# Patient Record
Sex: FEMALE | Race: WHITE | Employment: UNEMPLOYED | URBAN - METROPOLITAN AREA
[De-identification: names, ages, dates, MRNs, and addresses within clinical notes are randomized per-mention and may not be internally consistent; named-entity substitution may affect disease eponyms.]

---

## 2018-07-03 ENCOUNTER — HOSPITAL ENCOUNTER (EMERGENCY)
Age: 23
Discharge: HOME | End: 2018-07-03
Attending: EMERGENCY MEDICINE
Payer: OTHER GOVERNMENT

## 2018-07-03 ENCOUNTER — APPOINTMENT (OUTPATIENT)
Dept: GENERAL RADIOLOGY | Age: 23
End: 2018-07-03
Attending: EMERGENCY MEDICINE
Payer: OTHER GOVERNMENT

## 2018-07-03 VITALS
RESPIRATION RATE: 16 BRPM | WEIGHT: 170 LBS | DIASTOLIC BLOOD PRESSURE: 75 MMHG | TEMPERATURE: 97.6 F | HEART RATE: 84 BPM | SYSTOLIC BLOOD PRESSURE: 122 MMHG | HEIGHT: 63 IN | BODY MASS INDEX: 30.12 KG/M2 | OXYGEN SATURATION: 100 %
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
## 2018-07-03 PROCEDURE — 74011250637 HC RX REV CODE- 250/637: Performed by: EMERGENCY MEDICINE

## 2018-07-03 PROCEDURE — 77030028224 HC PDNG CST BSNM -A

## 2018-07-03 PROCEDURE — 99283 EMERGENCY DEPT VISIT LOW MDM: CPT

## 2018-07-03 PROCEDURE — 71046 X-RAY EXAM CHEST 2 VIEWS: CPT

## 2018-07-03 PROCEDURE — 73110 X-RAY EXAM OF WRIST: CPT

## 2018-07-03 PROCEDURE — 77030008323 HC SPLNT FNGR GTR DJOR -A

## 2018-07-03 RX ORDER — HYDROCODONE BITARTRATE AND ACETAMINOPHEN 5; 325 MG/1; MG/1
1 TABLET ORAL
Qty: 6 TAB | Refills: 0 | Status: SHIPPED | OUTPATIENT
Start: 2018-07-03 | End: 2020-03-26

## 2018-07-03 RX ORDER — NAPROXEN 500 MG/1
500 TABLET ORAL 2 TIMES DAILY WITH MEALS
Qty: 20 TAB | Refills: 0 | Status: SHIPPED | OUTPATIENT
Start: 2018-07-03 | End: 2018-07-13

## 2018-07-03 RX ORDER — HYDROCODONE BITARTRATE AND ACETAMINOPHEN 5; 325 MG/1; MG/1
1 TABLET ORAL
Status: COMPLETED | OUTPATIENT
Start: 2018-07-03 | End: 2018-07-03

## 2018-07-03 RX ORDER — CYCLOBENZAPRINE HCL 5 MG
5 TABLET ORAL
Qty: 12 TAB | Refills: 0 | Status: SHIPPED | OUTPATIENT
Start: 2018-07-03 | End: 2020-03-26

## 2018-07-03 RX ADMIN — HYDROCODONE BITARTRATE AND ACETAMINOPHEN 1 TABLET: 5; 325 TABLET ORAL at 11:05
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
## 2018-07-03 NOTE — LETTER
[facility] EMERGENCY DEPT 
[address] [place] [address] [id number] 
[phone] Work/School Note Date: 7/3/2018 To Whom It May concern: 
 
Nicki Santana was seen and treated today in the emergency room by the following provider(s): 
Attending Provider: Chetna Weems.  Pat Edmonds MD.   
 
Nicki Santana may not Keyboard or Type until cleared by orthopaedist. 
Sincerely, 
 
 
 
 
Jeff Cedeno RN

## 2018-07-03 NOTE — ED NOTES
Pt was discharged and given instructions by Dr Arti Child . Pt verbalized good understanding of all discharge instructions,prescriptions and F/U care. All questions answered. Pt in stable condition on discharge.

## 2020-02-25 ENCOUNTER — HOSPITAL ENCOUNTER (OUTPATIENT)
Dept: LAB | Age: 25
Discharge: HOME | End: 2020-02-25

## 2020-02-25 ENCOUNTER — OFFICE VISIT (OUTPATIENT)
Dept: INTERNAL MEDICINE CLINIC | Age: 25
End: 2020-02-25

## 2020-02-25 VITALS
HEART RATE: 81 BPM | BODY MASS INDEX: 32.96 KG/M2 | DIASTOLIC BLOOD PRESSURE: 64 MMHG | HEIGHT: 63 IN | WEIGHT: 186 LBS | OXYGEN SATURATION: 98 % | RESPIRATION RATE: 20 BRPM | TEMPERATURE: 97.9 F | SYSTOLIC BLOOD PRESSURE: 92 MMHG

## 2020-02-25 DIAGNOSIS — R79.89 ELEVATED LFTS: ICD-10-CM

## 2020-02-25 DIAGNOSIS — E03.9 ACQUIRED HYPOTHYROIDISM: ICD-10-CM

## 2020-02-25 DIAGNOSIS — K21.9 GASTROESOPHAGEAL REFLUX DISEASE WITHOUT ESOPHAGITIS: ICD-10-CM

## 2020-02-25 DIAGNOSIS — E66.9 OBESITY (BMI 30-39.9): ICD-10-CM

## 2020-02-25 DIAGNOSIS — Z23 ENCOUNTER FOR IMMUNIZATION: ICD-10-CM

## 2020-02-25 DIAGNOSIS — R55 SYNCOPE, UNSPECIFIED SYNCOPE TYPE: Primary | ICD-10-CM

## 2020-02-25 DIAGNOSIS — G89.29 OTHER CHRONIC PAIN: ICD-10-CM

## 2020-02-25 PROBLEM — B02.9 SHINGLES: Status: ACTIVE | Noted: 2020-02-25

## 2020-02-25 PROBLEM — E28.2 PCOS (POLYCYSTIC OVARIAN SYNDROME): Status: ACTIVE | Noted: 2020-02-25

## 2020-02-25 PROBLEM — R74.8 ELEVATED LIVER ENZYMES: Status: ACTIVE | Noted: 2020-02-25

## 2020-02-25 PROBLEM — R73.03 PREDIABETES: Status: ACTIVE | Noted: 2020-02-25

## 2020-02-25 PROBLEM — A49.02 MRSA INFECTION: Status: ACTIVE | Noted: 2020-02-25

## 2020-02-25 PROBLEM — E78.00 HYPERCHOLESTEROLEMIA: Status: ACTIVE | Noted: 2020-02-25

## 2020-02-25 PROBLEM — G44.89 OTHER HEADACHE SYNDROME: Status: ACTIVE | Noted: 2020-02-25

## 2020-02-25 PROBLEM — N80.9 ENDOMETRIOSIS: Status: ACTIVE | Noted: 2020-02-25

## 2020-02-25 PROBLEM — K12.2 LUDWIG'S ANGINA: Status: ACTIVE | Noted: 2020-02-25

## 2020-02-25 LAB
ALBUMIN SERPL-MCNC: 4.2 G/DL (ref 3.5–5)
ALBUMIN/GLOB SERPL: 1.2 {RATIO} (ref 1.1–2.2)
ALP SERPL-CCNC: 101 U/L (ref 45–117)
ALT SERPL-CCNC: 147 U/L (ref 12–78)
ANION GAP SERPL CALC-SCNC: 4 MMOL/L (ref 5–15)
AST SERPL-CCNC: 102 U/L (ref 15–37)
BASOPHILS # BLD: 0.1 K/UL (ref 0–0.1)
BASOPHILS NFR BLD: 1 % (ref 0–1)
BILIRUB SERPL-MCNC: 0.3 MG/DL (ref 0.2–1)
BUN SERPL-MCNC: 14 MG/DL (ref 6–20)
BUN/CREAT SERPL: 19 (ref 12–20)
CALCIUM SERPL-MCNC: 9.9 MG/DL (ref 8.5–10.1)
CHLORIDE SERPL-SCNC: 102 MMOL/L (ref 97–108)
CHOLEST SERPL-MCNC: 173 MG/DL
CO2 SERPL-SCNC: 32 MMOL/L (ref 21–32)
CREAT SERPL-MCNC: 0.72 MG/DL (ref 0.55–1.02)
DIFFERENTIAL METHOD BLD: NORMAL
EOSINOPHIL # BLD: 0.1 K/UL (ref 0–0.4)
EOSINOPHIL NFR BLD: 1 % (ref 0–7)
ERYTHROCYTE [DISTWIDTH] IN BLOOD BY AUTOMATED COUNT: 12.4 % (ref 11.5–14.5)
GLOBULIN SER CALC-MCNC: 3.4 G/DL (ref 2–4)
GLUCOSE SERPL-MCNC: 104 MG/DL (ref 65–100)
HCT VFR BLD AUTO: 39.7 % (ref 35–47)
HDLC SERPL-MCNC: 70 MG/DL
HDLC SERPL: 2.5 {RATIO} (ref 0–5)
HGB BLD-MCNC: 12.6 G/DL (ref 11.5–16)
IMM GRANULOCYTES # BLD AUTO: 0 K/UL (ref 0–0.04)
IMM GRANULOCYTES NFR BLD AUTO: 0 % (ref 0–0.5)
LDLC SERPL CALC-MCNC: 80.8 MG/DL (ref 0–100)
LIPID PROFILE,FLP: NORMAL
LYMPHOCYTES # BLD: 2.6 K/UL (ref 0.8–3.5)
LYMPHOCYTES NFR BLD: 39 % (ref 12–49)
MCH RBC QN AUTO: 29.1 PG (ref 26–34)
MCHC RBC AUTO-ENTMCNC: 31.7 G/DL (ref 30–36.5)
MCV RBC AUTO: 91.7 FL (ref 80–99)
MONOCYTES # BLD: 0.6 K/UL (ref 0–1)
MONOCYTES NFR BLD: 9 % (ref 5–13)
NEUTS SEG # BLD: 3.3 K/UL (ref 1.8–8)
NEUTS SEG NFR BLD: 50 % (ref 32–75)
NRBC # BLD: 0 K/UL (ref 0–0.01)
NRBC BLD-RTO: 0 PER 100 WBC
PLATELET # BLD AUTO: 229 K/UL (ref 150–400)
PMV BLD AUTO: 11.9 FL (ref 8.9–12.9)
POTASSIUM SERPL-SCNC: 4.3 MMOL/L (ref 3.5–5.1)
PROT SERPL-MCNC: 7.6 G/DL (ref 6.4–8.2)
RBC # BLD AUTO: 4.33 M/UL (ref 3.8–5.2)
SODIUM SERPL-SCNC: 138 MMOL/L (ref 136–145)
TRIGL SERPL-MCNC: 111 MG/DL (ref ?–150)
TSH SERPL DL<=0.05 MIU/L-ACNC: 2.66 UIU/ML (ref 0.36–3.74)
VLDLC SERPL CALC-MCNC: 22.2 MG/DL
WBC # BLD AUTO: 6.6 K/UL (ref 3.6–11)

## 2020-02-25 RX ORDER — METFORMIN HYDROCHLORIDE 500 MG/1
TABLET ORAL 2 TIMES DAILY WITH MEALS
COMMUNITY
End: 2020-03-26 | Stop reason: SDUPTHER

## 2020-02-25 RX ORDER — LEVOTHYROXINE SODIUM 100 UG/1
TABLET ORAL
COMMUNITY
End: 2020-03-26 | Stop reason: SDUPTHER

## 2020-02-25 RX ORDER — VALACYCLOVIR HYDROCHLORIDE 500 MG/1
TABLET, FILM COATED ORAL AS NEEDED
COMMUNITY
End: 2020-02-25

## 2020-02-25 RX ORDER — RANITIDINE 150 MG/1
150 TABLET, FILM COATED ORAL 2 TIMES DAILY
COMMUNITY
End: 2020-03-26 | Stop reason: SDUPTHER

## 2020-02-25 RX ORDER — ROSUVASTATIN CALCIUM 10 MG/1
10 TABLET, COATED ORAL
COMMUNITY
End: 2020-03-26
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
## 2020-02-25 NOTE — PROGRESS NOTES
Amy Seals is a 25 y.o. female who presents for routine immunizations. She denies any symptoms , reactions or allergies that would exclude them from being immunized today. Risks and adverse reactions were discussed and the VIS was given to them. All questions were addressed. There were no reactions observed.     Fatou Díaz LPN

## 2020-02-25 NOTE — PATIENT INSTRUCTIONS
Vaccine Information Statement Tdap (Tetanus, Diphtheria, Pertussis) Vaccine: What You Need to Know Many Vaccine Information Statements are available in [place] and other languages. See www.immunize.org/vis. Hojas de Información Sobre Vacunas están disponibles en español y en muchos otros idiomas. Visite [email or web address].si 1. Why get vaccinated? Tetanus, diphtheria, and pertussis are very serious diseases. Tdap vaccine can protect us from these diseases. And, Tdap vaccine given to pregnant women can protect  babies against pertussis. TETANUS (Lockjaw) is rare in the [facility] today. It causes painful muscle tightening and stiffness, usually all over the body. ? It can lead to tightening of muscles in the head and neck so you cant open your mouth, swallow, or sometimes even breathe. Tetanus kills about 1 out of 10 people who are infected even after receiving the best medical care. DIPHTHERIA is also rare in the [facility] today. It can cause a thick coating to form in the back of the throat. ? It can lead to breathing problems, heart failure, paralysis, and death. PERTUSSIS (Whooping Cough) causes severe coughing spells, which can cause difficulty breathing, vomiting, and disturbed sleep. ? It can also lead to weight loss, incontinence, and rib fractures. Up to 2 in 100 adolescents and 5 in 100 adults with pertussis are hospitalized or have complications, which could include pneumonia or death. These diseases are caused by bacteria. Diphtheria and pertussis are spread from person to person through secretions from coughing or sneezing. Tetanus enters the body through cuts, scratches, or wounds. Before vaccines, as many as 200,000 cases of diphtheria, 200,000 cases of pertussis, and hundreds of cases of tetanus, were reported in the [place] each year.  Since vaccination began, reports of cases for tetanus and diphtheria have dropped by about 99% and for pertussis by about 80%. 2. Tdap vaccine Tdap vaccine can protect adolescents and adults from tetanus, diphtheria, and pertussis. One dose of Tdap is routinely given at age 6 or 15. People who did not get Tdap at that age should get it as soon as possible. Tdap is especially important for health care professionals and anyone having close contact with a baby younger than 12 months. Pregnant women should get a dose of Tdap during every pregnancy, to protect the  from pertussis. Infants are most at risk for severe, life-threatening complications from pertussis. Another vaccine, called Td, protects against tetanus and diphtheria, but not pertussis. A Td booster should be given every 10 years. Tdap may be given as one of these boosters if you have never gotten Tdap before. Tdap may also be given after a severe cut or burn to prevent tetanus infection. Your doctor or the person giving you the vaccine can give you more information. Tdap may safely be given at the same time as other vaccines. 3. Some people should not get this vaccine  A person who has ever had a life-threatening allergic reaction after a previous dose of any diphtheria, tetanus or pertussis containing vaccine, OR has a severe allergy to any part of this vaccine, should not get Tdap vaccine. Tell the person giving the vaccine about any severe allergies.  Anyone who had coma or long repeated seizures within 7 days after a childhood dose of DTP or DTaP, or a previous dose of Tdap, should not get Tdap, unless a cause other than the vaccine was found. They can still get Td.  Talk to your doctor if you: 
- have seizures or another nervous system problem, 
- had severe pain or swelling after any vaccine containing diphtheria, tetanus or pertussis,  
- ever had a condition called Guillain Barré Syndrome (GBS), 
- arent feeling well on the day the shot is scheduled. 4. Risks With any medicine, including vaccines, there is a chance of side effects. These are usually mild and go away on their own. Serious reactions are also possible but are rare. Most people who get Tdap vaccine do not have any problems with it. Mild Problems following Tdap 
(Did not interfere with activities)  Pain where the shot was given (about 3 in 4 adolescents or 2 in 3 adults)  Redness or swelling where the shot was given (about 1 person in 5)  Mild fever of at least 100.4°F (up to about 1 in 25 adolescents or 1 in 100 adults)  Headache (about 3 or 4 people in 10)  Tiredness (about 1 person in 3 or 4)  Nausea, vomiting, diarrhea, stomach ache (up to 1 in 4 adolescents or 1 in 10 adults)  Chills,  sore joints (about 1 person in 10)  Body aches (about 1 person in 3 or 4)  Rash, swollen glands (uncommon) Moderate Problems following Tdap (Interfered with activities, but did not require medical attention)  Pain where the shot was given (up to 1 in 5 or 6)  Redness or swelling where the shot was given (up to about 1 in 16 adolescents or 1 in 12 adults)  Fever over 102°F (about 1 in 100 adolescents or 1 in 250 adults)  Headache (about 1 in 7 adolescents or 1 in 10 adults)  Nausea, vomiting, diarrhea, stomach ache (up to 1 or 3 people in 100)  Swelling of the entire arm where the shot was given (up to about 1 in 500). Severe Problems following Tdap 
(Unable to perform usual activities; required medical attention)  Swelling, severe pain, bleeding, and redness in the arm where the shot was given (rare). Problems that could happen after any vaccine:  People sometimes faint after a medical procedure, including vaccination. Sitting or lying down for about 15 minutes can help prevent fainting, and injuries caused by a fall. Tell your doctor if you feel dizzy, or have vision changes or ringing in the ears.  Some people get severe pain in the shoulder and have difficulty moving the arm where a shot was given. This happens very rarely.  Any medication can cause a severe allergic reaction. Such reactions from a vaccine are very rare, estimated at fewer than 1 in a million doses, and would happen within a few minutes to a few hours after the vaccination. As with any medicine, there is a very remote chance of a vaccine causing a serious injury or death. The safety of vaccines is always being monitored. For more information, visit: www.cdc.gov/vaccinesafety/ 
 
 
The [facility] Vaccine Injury Compensation Program (VICP) is a federal program that was created to compensate people who may have been injured by certain vaccines. Persons who believe they may have been injured by a vaccine can learn about the program and about filing a claim by calling 8-898.357.4936 or visiting the [employer] website at [email or web address].  There is a time limit to file a claim for compensation. 7. How can I learn more?  Ask your doctor. He or she can give you the vaccine package insert or suggest other sources of information.  Call your local or state health department.  Contact the Centers for Disease Control and Prevention (CDC): 
- Call 9-509.135.4958 (1-800-CDC-INFO) or 
- Visit CDCs website at www.cdc.gov/vaccines Vaccine Information Statement Tdap Vaccine 
(2/24/2015) [address] [employer] 512BN-63 Department of [facility] and [employer] Centers for Disease Control and Prevention Office Use Only

## 2020-02-26 LAB
EST. AVERAGE GLUCOSE BLD GHB EST-MCNC: 108 MG/DL
HBA1C MFR BLD: 5.4 % (ref 4–5.6)

## 2020-03-05 NOTE — PROGRESS NOTES
Please call the pt and let her know that her labs are normal except for elevated liver enzymes. I recommend stopping rosuvastatin to see if that is what is affecting her liver enzymes. Please confirm whether or not she has a follow-up appointment with the GI doctors. I am still waiting on records from her other doctors.

## 2020-03-05 NOTE — PROGRESS NOTES
Called patient ASHISH bravo. Informed her labs are normal except for elevated liver enzymes.  Doctor is recommening stopping rosuvastatin to see if that is what is affecting her liver enzymes. Patient agreed to the plan to stop medication. Patient does not have a follow up appointment with the GI doctors.

## 2020-03-26 ENCOUNTER — VIRTUAL VISIT (OUTPATIENT)
Dept: INTERNAL MEDICINE CLINIC | Age: 25
End: 2020-03-26

## 2020-03-26 DIAGNOSIS — E55.9 VITAMIN D DEFICIENCY: ICD-10-CM

## 2020-03-26 DIAGNOSIS — R55 SYNCOPE, UNSPECIFIED SYNCOPE TYPE: ICD-10-CM

## 2020-03-26 DIAGNOSIS — R73.03 PREDIABETES: ICD-10-CM

## 2020-03-26 DIAGNOSIS — K21.9 GASTROESOPHAGEAL REFLUX DISEASE WITHOUT ESOPHAGITIS: ICD-10-CM

## 2020-03-26 DIAGNOSIS — G89.29 OTHER CHRONIC PAIN: ICD-10-CM

## 2020-03-26 DIAGNOSIS — E03.9 ACQUIRED HYPOTHYROIDISM: ICD-10-CM

## 2020-03-26 DIAGNOSIS — R74.8 ELEVATED LIVER ENZYMES: Primary | ICD-10-CM

## 2020-03-26 DIAGNOSIS — E28.2 PCOS (POLYCYSTIC OVARIAN SYNDROME): ICD-10-CM

## 2020-03-26 RX ORDER — LEVOTHYROXINE SODIUM 100 UG/1
100 TABLET ORAL
Qty: 90 TAB | Refills: 3 | Status: SHIPPED | OUTPATIENT
Start: 2020-03-26 | End: 2021-02-04 | Stop reason: SDUPTHER

## 2020-03-26 RX ORDER — MELATONIN
1000 DAILY
Qty: 90 TAB | Refills: 3 | Status: SHIPPED | OUTPATIENT
Start: 2020-03-26 | End: 2021-02-04

## 2020-03-26 RX ORDER — RANITIDINE 150 MG/1
150 TABLET, FILM COATED ORAL 2 TIMES DAILY
Qty: 180 TAB | Refills: 3 | Status: SHIPPED | OUTPATIENT
Start: 2020-03-26 | End: 2020-04-22

## 2020-03-26 RX ORDER — METFORMIN HYDROCHLORIDE 500 MG/1
500 TABLET ORAL 2 TIMES DAILY WITH MEALS
Qty: 180 TAB | Refills: 3 | Status: SHIPPED | OUTPATIENT
Start: 2020-03-26 | End: 2021-02-04 | Stop reason: SDUPTHER

## 2020-03-26 NOTE — PROGRESS NOTES
Jennifer Mcdaniel is a 22 y.o. female who was seen by synchronous (real-time) audio-video technology on 3/26/2020. This visit was done with doxy. me    Assessment and Plan   Diagnoses and all orders for this visit:    1. Elevated liver enzymes  2. Syncope, unspecified syncope type  3. Other chronic pain  -     HBV CORE AB, IGG/IGM; Future  -     HEP B SURFACE AG; Future  -     HEP B SURFACE AB; Future  -     HEPATITIS C AB; Future  -     CELIAC ANTIBODY PROFILE; Future  -     CK; Future  -     CORTISOL, AM; Future  -     HEPATIC FUNCTION PANEL; Future  Patient has been off of statin for a month. We will see if that has made a difference to her liver enzymes. autoimmune workup negative, raudel negative, alpha1 negative. Imaging negative according to note from GI. Evaluate for other possible causes of elevated liver enzymes, syncope, and chronic pain. Has follow-up with her neurologist next week. We will try to get those records to see what work-up neurology has done for her syncope. Recommend stopping all alcohol use. Consider neuropathy work-up if neurology has not ordered those labs. Consider echo. Had an MRI and EMG done. Need those records. Advised patient to come in the morning for lab work so that her cortisol level is accurate    4. Vitamin D deficiency  -     cholecalciferol (Vitamin D3) (1000 Units /25 mcg) tablet; Take 1 Tab by mouth daily. Refill provided    5. Acquired hypothyroidism  -     levothyroxine (SYNTHROID) 100 mcg tablet; Take 1 Tab by mouth Daily (before breakfast). Refill provided    6. PCOS (polycystic ovarian syndrome)  -     metFORMIN (GLUCOPHAGE) 500 mg tablet; Take 1 Tab by mouth two (2) times daily (with meals). Refill provided    7. Prediabetes  -     metFORMIN (GLUCOPHAGE) 500 mg tablet; Take 1 Tab by mouth two (2) times daily (with meals). Refill provided    8. Gastroesophageal reflux disease without esophagitis  -     raNITIdine (ZANTAC) 150 mg tablet;  Take 1 Tab by mouth two (2) times a day. Refill provided      We discussed the expected course, resolution and complications of the diagnosis(es) in detail. Medication risks, benefits, costs, interactions, and alternatives were discussed as indicated. I advised her to contact the office if her condition worsens, changes or fails to improve as anticipated. She expressed understanding with the diagnosis(es) and plan. Return to clinic: Pending labs  Please call the pt and let her know that her liver function test are significantly improved. Will repeat labs in 6 months to ensure stability. Elevated levels most likely due to statin since they got better after stopping the medication. The rest of her labs were normal and did not show any other causes for elevated liver enzymes. Letter sent  Pt not HBV immune  4/8/20 addendum - review of neuro records - LE EMG was normal    Curtis Luque MD  [facility]  3/26/2020    Future Appointments   Date Time Provider Rosalind Baldwini   3/30/4433 90:27 AM Cheo Gotti MD [address]        Subjective   Chief Complaint   Elevated LFTs    Gus Benítez is a 22 y.o. female     Elevated LFTs has been off of her statin for the past month. Will need to get rechecked. Discussed other possible etiologies. Not currently on any over-the-counter medications or herbal supplements. Syncope patient reports 1 or 2 episodes since her last visit. Both episodes still during episodes of severe pain. Has a follow-up with neurology next week. Chronic pain continues to be persistent. Review of Systems   Constitutional: Negative for chills and fever. Neurological: Positive for loss of consciousness. Objective   Vitals: There were no vitals taken for this visit. Physical Exam  Constitutional:       Appearance: Normal appearance.    Pulmonary:      Effort: Pulmonary effort is normal. Neurological:      Mental Status: She is alert. Psychiatric:         Mood and Affect: Mood normal.         Thought Content: Thought content normal.         Judgment: Judgment normal.          Due to this being a TeleHealth evaluation, many elements of the physical examination are unable to be assessed. Voice recognition software is utilized and this note may contain transcription errors       Pursuant to the emergency declaration under the [address] [place] [address] waiver authority and the [employer] Act, this Virtual  Visit was conducted, with patient's consent, to reduce the patient's risk of exposure to COVID-19 and provide continuity of care for an established patient. Services were provided through a video synchronous discussion virtually to substitute for in-person clinic visit.

## 2020-03-27 ENCOUNTER — HOSPITAL ENCOUNTER (OUTPATIENT)
Dept: LAB | Age: 25
Discharge: HOME | End: 2020-03-27

## 2020-03-27 DIAGNOSIS — R74.8 ELEVATED LIVER ENZYMES: ICD-10-CM

## 2020-03-27 LAB
ALBUMIN SERPL-MCNC: 4 G/DL (ref 3.5–5)
ALBUMIN/GLOB SERPL: 1.2 {RATIO} (ref 1.1–2.2)
ALP SERPL-CCNC: 103 U/L (ref 45–117)
ALT SERPL-CCNC: 66 U/L (ref 12–78)
AST SERPL-CCNC: 26 U/L (ref 15–37)
BILIRUB DIRECT SERPL-MCNC: <0.1 MG/DL (ref 0–0.2)
BILIRUB SERPL-MCNC: 0.2 MG/DL (ref 0.2–1)
CK SERPL-CCNC: 60 U/L (ref 26–192)
CORTIS AM PEAK SERPL-MCNC: 10.4 UG/DL (ref 4.3–22.45)
GLOBULIN SER CALC-MCNC: 3.3 G/DL (ref 2–4)
HBV SURFACE AB SER QL: NONREACTIVE
HBV SURFACE AB SER-ACNC: <3.1 MIU/ML
HBV SURFACE AG SER QL: 0.54 INDEX
HBV SURFACE AG SER QL: NEGATIVE
HCV AB SERPL QL IA: NONREACTIVE
HCV COMMENT,HCGAC: NORMAL
PROT SERPL-MCNC: 7.3 G/DL (ref 6.4–8.2)

## 2020-03-28 LAB
HBV CORE AB SERPL QL IA: NEGATIVE
HBV CORE IGM SERPL QL IA: NEGATIVE

## 2020-03-30 ENCOUNTER — TELEPHONE (OUTPATIENT)
Dept: INTERNAL MEDICINE CLINIC | Age: 25
End: 2020-03-30

## 2020-03-30 LAB
GLIADIN PEPTIDE IGA SER-ACNC: 3 UNITS (ref 0–19)
GLIADIN PEPTIDE IGG SER-ACNC: 2 UNITS (ref 0–19)
IGA SERPL-MCNC: 89 MG/DL (ref 87–352)
TTG IGA SER-ACNC: <2 U/ML (ref 0–3)
TTG IGG SER-ACNC: 3 U/ML (ref 0–5)

## 2020-03-30 NOTE — PROGRESS NOTES
Please call the pt and let her know that her liver function test are significantly improved. Will repeat labs in 6 months to ensure stability. Elevated levels most likely due to statin since they got better after stopping the medication. The rest of her labs were normal and did not show any other causes for elevated liver enzymes.  Letter sent

## 2020-03-30 NOTE — TELEPHONE ENCOUNTER
Spoke to patient mother Trina Stein on HIPPA form informed her Ashley's  liver function test are significantly improved.  Dr Jaquan Muñoz will repeat labs in 6 months to ensure stability.  Elevated levels most likely due to statin since they got better after stopping the medication.  The rest of her labs were normal and did not show any other causes for elevated liver enzymes. Advised a letter with details has been sent.

## 2020-04-22 ENCOUNTER — TELEPHONE (OUTPATIENT)
Dept: INTERNAL MEDICINE CLINIC | Age: 25
End: 2020-04-22

## 2020-04-22 DIAGNOSIS — K21.9 GASTROESOPHAGEAL REFLUX DISEASE WITHOUT ESOPHAGITIS: Primary | ICD-10-CM

## 2020-04-22 RX ORDER — FAMOTIDINE 20 MG/1
20 TABLET, FILM COATED ORAL 2 TIMES DAILY
Qty: 180 TAB | Refills: 2 | Status: SHIPPED | OUTPATIENT
Start: 2020-04-22 | End: 2021-02-04

## 2020-07-13 ENCOUNTER — HOSPITAL ENCOUNTER (OUTPATIENT)
Dept: LAB | Age: 25
Discharge: HOME | End: 2020-07-13

## 2020-07-13 ENCOUNTER — OFFICE VISIT (OUTPATIENT)
Dept: INTERNAL MEDICINE CLINIC | Age: 25
End: 2020-07-13

## 2020-07-13 VITALS
OXYGEN SATURATION: 98 % | DIASTOLIC BLOOD PRESSURE: 71 MMHG | HEART RATE: 83 BPM | TEMPERATURE: 98.9 F | WEIGHT: 184.38 LBS | BODY MASS INDEX: 32.67 KG/M2 | HEIGHT: 63 IN | SYSTOLIC BLOOD PRESSURE: 106 MMHG | RESPIRATION RATE: 18 BRPM

## 2020-07-13 DIAGNOSIS — R10.9 ABDOMINAL CRAMPING: Primary | ICD-10-CM

## 2020-07-13 DIAGNOSIS — K21.9 GASTROESOPHAGEAL REFLUX DISEASE WITHOUT ESOPHAGITIS: ICD-10-CM

## 2020-07-13 DIAGNOSIS — L85.3 DRY SKIN: ICD-10-CM

## 2020-07-13 DIAGNOSIS — H69.83 DYSFUNCTION OF BOTH EUSTACHIAN TUBES: ICD-10-CM

## 2020-07-13 DIAGNOSIS — R10.9 ABDOMINAL CRAMPING: ICD-10-CM

## 2020-07-13 LAB
ALBUMIN SERPL-MCNC: 4.2 G/DL (ref 3.5–5)
ALBUMIN/GLOB SERPL: 1.3 {RATIO} (ref 1.1–2.2)
ALP SERPL-CCNC: 90 U/L (ref 45–117)
ALT SERPL-CCNC: 46 U/L (ref 12–78)
ANION GAP SERPL CALC-SCNC: 9 MMOL/L (ref 5–15)
AST SERPL-CCNC: 32 U/L (ref 15–37)
BASOPHILS # BLD: 0 K/UL (ref 0–0.1)
BASOPHILS NFR BLD: 1 % (ref 0–1)
BILIRUB SERPL-MCNC: 0.3 MG/DL (ref 0.2–1)
BUN SERPL-MCNC: 12 MG/DL (ref 6–20)
BUN/CREAT SERPL: 17 (ref 12–20)
CALCIUM SERPL-MCNC: 9.6 MG/DL (ref 8.5–10.1)
CHLORIDE SERPL-SCNC: 103 MMOL/L (ref 97–108)
CO2 SERPL-SCNC: 28 MMOL/L (ref 21–32)
CREAT SERPL-MCNC: 0.71 MG/DL (ref 0.55–1.02)
DIFFERENTIAL METHOD BLD: NORMAL
EOSINOPHIL # BLD: 0.1 K/UL (ref 0–0.4)
EOSINOPHIL NFR BLD: 1 % (ref 0–7)
ERYTHROCYTE [DISTWIDTH] IN BLOOD BY AUTOMATED COUNT: 12.6 % (ref 11.5–14.5)
GLOBULIN SER CALC-MCNC: 3.2 G/DL (ref 2–4)
GLUCOSE SERPL-MCNC: 92 MG/DL (ref 65–100)
HCT VFR BLD AUTO: 37.9 % (ref 35–47)
HGB BLD-MCNC: 12.4 G/DL (ref 11.5–16)
IMM GRANULOCYTES # BLD AUTO: 0 K/UL (ref 0–0.04)
IMM GRANULOCYTES NFR BLD AUTO: 0 % (ref 0–0.5)
LYMPHOCYTES # BLD: 2.7 K/UL (ref 0.8–3.5)
LYMPHOCYTES NFR BLD: 42 % (ref 12–49)
MCH RBC QN AUTO: 28.2 PG (ref 26–34)
MCHC RBC AUTO-ENTMCNC: 32.7 G/DL (ref 30–36.5)
MCV RBC AUTO: 86.3 FL (ref 80–99)
MONOCYTES # BLD: 0.5 K/UL (ref 0–1)
MONOCYTES NFR BLD: 7 % (ref 5–13)
NEUTS SEG # BLD: 3.2 K/UL (ref 1.8–8)
NEUTS SEG NFR BLD: 49 % (ref 32–75)
NRBC # BLD: 0 K/UL (ref 0–0.01)
NRBC BLD-RTO: 0 PER 100 WBC
PLATELET # BLD AUTO: 222 K/UL (ref 150–400)
PMV BLD AUTO: 12.4 FL (ref 8.9–12.9)
POTASSIUM SERPL-SCNC: 4.2 MMOL/L (ref 3.5–5.1)
PROT SERPL-MCNC: 7.4 G/DL (ref 6.4–8.2)
RBC # BLD AUTO: 4.39 M/UL (ref 3.8–5.2)
SODIUM SERPL-SCNC: 140 MMOL/L (ref 136–145)
WBC # BLD AUTO: 6.5 K/UL (ref 3.6–11)

## 2020-07-13 RX ORDER — PANTOPRAZOLE SODIUM 40 MG/1
40 TABLET, DELAYED RELEASE ORAL DAILY
Qty: 30 TAB | Refills: 1 | Status: SHIPPED | OUTPATIENT
Start: 2020-07-13 | End: 2021-02-04

## 2020-07-13 RX ORDER — DICYCLOMINE HYDROCHLORIDE 10 MG/1
20 CAPSULE ORAL
Qty: 30 CAP | Refills: 1 | Status: SHIPPED | OUTPATIENT
Start: 2020-07-13 | End: 2021-02-04

## 2020-07-13 NOTE — PROGRESS NOTES
Assessment and Plan   Diagnoses and all orders for this visit:    1. Abdominal cramping  -     FECAL FAT, QL; Future  -     dicyclomine (BENTYL) 10 mg capsule; Take 2 Caps by mouth three (3) times daily as needed for Abdominal Cramps. -     METABOLIC PANEL, COMPREHENSIVE; Future  -     CBC WITH AUTOMATED DIFF; Future  Unclear etiology. Has had multiple colonoscopies in the past which were reportedly negative. Did have her gallbladder taken out late last year. Patient is wondering if her reflux is anything to do with it. Recommend switching or adding pantoprazole. Advised on proper administration. Bentyl for symptomatic treatment. If no improvement, recommend going back to GI    2. Gastroesophageal reflux disease without esophagitis  -     pantoprazole (PROTONIX) 40 mg tablet; Take 1 Tab by mouth daily. 3. Dysfunction of both eustachian tubes  Recommend over-the-counter nasal steroid spray. Advised on proper administration    4. Dry skin  Patient will try some of the lotion she has at home. Otherwise, advised to call me if it does not get any better and we will treat as a possible fungal infection      Benefits, risks, possible drug interactions, and side effects of all new medications were reviewed with the patient. Pt verbalized understanding. Return to clinic: As needed if no improvement in symptoms    Ernie Verdugo MD  [facility]  7/13/2020    No future appointments. Subjective   Chief Complaint   abd cramping    Cade Silveira is a 22 y.o. female     Presents to clinic for evaluation of abdominal cramping associated with diarrhea for the past month. A previous note of mine mentioned alternating constipation and diarrhea but today patient reports that diarrhea is a new issue for her. Has a history of multiple colonoscopies which have been negative.   Cramping is her main symptom at the moment although she reports that she has diarrhea quickly after she eats. Stools are foul-smelling but denies any blood. Previous celiac testing was negative. Denies any fever, chills. Reports occasional nausea but no vomiting. Feels that her reflux is not adequately controlled on famotidine. Wondering if she should switch to something else. Has also noticed some dry skin on her feet. Also reports bilateral ear pain ongoing for the past month as well. Denies any hearing loss, sinus congestion or drainage although reports occasional sinus headaches. Review of Systems   Constitutional: Negative for chills and fever. Respiratory: Negative for shortness of breath. Cardiovascular: Negative for chest pain. Objective   Vitals:       Visit Vitals  /71 (BP 1 Location: Left arm, BP Patient Position: Sitting)   Pulse 83   Temp 98.9 °F (37.2 °C) (Oral)   Resp 18   Ht 5' 3\" (1.6 m)   Wt 184 lb 6 oz (83.6 kg)   SpO2 98%   BMI 32.66 kg/m²        Physical Exam  Constitutional:       Appearance: Normal appearance. She is not ill-appearing. HENT:      Right Ear: Ear canal and external ear normal.      Left Ear: Ear canal and external ear normal.      Ears:      Comments: Retracted eardrums bilaterally especially on the left  Eyes:      General:         Right eye: No discharge. Left eye: No discharge. Extraocular Movements: Extraocular movements intact. Conjunctiva/sclera: Conjunctivae normal.   Cardiovascular:      Rate and Rhythm: Normal rate and regular rhythm. Heart sounds: No murmur. No friction rub. No gallop. Pulmonary:      Effort: No respiratory distress. Breath sounds: No wheezing, rhonchi or rales. Abdominal:      General: Bowel sounds are normal. There is no distension. Palpations: Abdomen is soft. There is no mass. Tenderness: There is no abdominal tenderness. There is no guarding or rebound. Neurological:      Mental Status: She is alert.       Gait: Gait normal.   Psychiatric:         Thought Content: Thought content normal.         Judgment: Judgment normal.          Current Outpatient Medications   Medication Sig    pantoprazole (PROTONIX) 40 mg tablet Take 1 Tab by mouth daily.  dicyclomine (BENTYL) 10 mg capsule Take 2 Caps by mouth three (3) times daily as needed for Abdominal Cramps.  famotidine (PEPCID) 20 mg tablet Take 1 Tab by mouth two (2) times a day.  metFORMIN (GLUCOPHAGE) 500 mg tablet Take 1 Tab by mouth two (2) times daily (with meals).  levothyroxine (SYNTHROID) 100 mcg tablet Take 1 Tab by mouth Daily (before breakfast).  cholecalciferol (Vitamin D3) (1000 Units /25 mcg) tablet Take 1 Tab by mouth daily.  pregabalin (LYRICA PO) Take 50 mg by mouth three (3) times daily.  Cetirizine 10 mg cap Take  by mouth. No current facility-administered medications for this visit.         Voice recognition software is utilized and this note may contain transcription errors

## 2020-07-15 LAB
FAT STL QL: NORMAL
NEUTRAL FAT STL QL: NORMAL

## 2020-07-17 ENCOUNTER — TELEPHONE (OUTPATIENT)
Dept: INTERNAL MEDICINE CLINIC | Age: 25
End: 2020-07-17

## 2020-07-21 NOTE — PROGRESS NOTES
Discussed lab results with the patient. Had tried calling her last week but she was out on vacation. Reports that her diarrhea has since improved somewhat from her visit. We discussed that the most likely diagnosis for her symptoms is irritable bowel syndrome given her history of alternating constipation and diarrhea and negative colonoscopies and previous work-up. Patient verbalized understanding and will let us know if she needs medications for symptoms.

## 2020-09-28 ENCOUNTER — VIRTUAL VISIT (OUTPATIENT)
Dept: INTERNAL MEDICINE CLINIC | Age: 25
End: 2020-09-28
Payer: MEDICAID

## 2020-09-28 DIAGNOSIS — R07.81 RIB PAIN: Primary | ICD-10-CM

## 2020-09-28 DIAGNOSIS — R69 ILL-DEFINED CONDITION: ICD-10-CM

## 2020-09-28 PROCEDURE — 99214 OFFICE O/P EST MOD 30 MIN: CPT | Performed by: INTERNAL MEDICINE

## 2020-09-28 RX ORDER — MELOXICAM 7.5 MG/1
TABLET ORAL
Qty: 30 TAB | Refills: 0 | Status: SHIPPED | OUTPATIENT
Start: 2020-09-28 | End: 2021-02-04

## 2020-09-28 NOTE — PROGRESS NOTES
Consent: Angelina Sampson, who was seen by synchronous (real-time) audio-video technology, and/or her healthcare decision maker, is aware that this patient-initiated, Telehealth encounter on 9/28/2020 is a billable service, with coverage as determined by her insurance carrier. She is aware that she may receive a bill and has provided verbal consent to proceed: Yes. I was in the office while conducting this encounter. This visit was done with doxy. me    Assessment and Plan   Diagnoses and all orders for this visit:    1. Rib pain  -     meloxicam (MOBIC) 7.5 mg tablet; Take 1-2 tablets by mouth once a day as needed for pain  Presents to clinic for 2-1/2-week history of bilateral mid thoracic rib pain anteriorly and posteriorly that has not improved. Reports this is preventing her from breathing easily. Tender to palpation and hurts when she moves. Reports having an occasional cough but \"nothing major. \"  Has been sneezing a lot recently. Denies any fever, chills, rashes in the area. Has been using ibuprofen and Tylenol with no improvement. Endorses worsening lower extremity edema    Unclear etiology and it is difficult to evaluate her over the phone. She lives far away so she will go to the emergency room for an in-person evaluation. Sounds mostly like costochondritis but I would prefer someone to do a physical exam to rule out other causes especially given new onset lower extremity edema. She is planning on going to the [facility] ER in her area. 2. Ill-defined condition  -     REFERRAL TO RHEUMATOLOGY  Complicated medical history with multiple complaints in multiple symptoms. Has had several year history of chronic pain. Recently had an LP to evaluate for multiple sclerosis. Patient is requesting a rheumatology referral      We discussed the expected course, resolution and complications of the diagnosis(es) in detail.   Medication risks, benefits, costs, interactions, and alternatives were discussed as indicated. I advised her to contact the office if her condition worsens, changes or fails to improve as anticipated. She expressed understanding with the diagnosis(es) and plan. Return to clinic: As needed    Teresa Haskins MD  [facility]  9/28/2020    No future appointments. Subjective   Chief Complaint   Rib pain    Bethany Stroud is a 22 y.o. female         Review of Systems   Constitutional: Negative for chills and fever. Respiratory: Positive for shortness of breath. Cardiovascular: Negative for chest pain. Objective   Vitals:       Patient-Reported Vitals 9/28/2020   Patient-Reported Weight 184.0lb                 Physical Exam  Constitutional:       Appearance: Normal appearance. She is not ill-appearing. HENT:      Head: Normocephalic and atraumatic. Right Ear: External ear normal.      Left Ear: External ear normal.      Mouth/Throat:      Mouth: Mucous membranes are moist.   Eyes:      General:         Right eye: No discharge. Left eye: No discharge. Extraocular Movements: Extraocular movements intact. Conjunctiva/sclera: Conjunctivae normal.   Neck:      Musculoskeletal: Normal range of motion. Pulmonary:      Effort: Pulmonary effort is normal. No respiratory distress. Musculoskeletal:      Comments: Able to hold phone without issue   Skin:     Comments: No obvious facial rashes or abnormalities   Neurological:      Mental Status: She is alert and oriented to person, place, and time. Comments: No obvious focal deficit   Psychiatric:         Mood and Affect: Mood normal.         Thought Content: Thought content normal.         Judgment: Judgment normal.          Current Outpatient Medications   Medication Sig    meloxicam (MOBIC) 7.5 mg tablet Take 1-2 tablets by mouth once a day as needed for pain    pantoprazole (PROTONIX) 40 mg tablet Take 1 Tab by mouth daily.     famotidine (PEPCID) 20 mg tablet Take 1 Tab by mouth two (2) times a day.  metFORMIN (GLUCOPHAGE) 500 mg tablet Take 1 Tab by mouth two (2) times daily (with meals).  levothyroxine (SYNTHROID) 100 mcg tablet Take 1 Tab by mouth Daily (before breakfast).  pregabalin (LYRICA PO) Take 50 mg by mouth three (3) times daily.  Cetirizine 10 mg cap Take  by mouth.  dicyclomine (BENTYL) 10 mg capsule Take 2 Caps by mouth three (3) times daily as needed for Abdominal Cramps. (Patient taking differently: Take 20 mg by mouth three (3) times daily as needed for Abdominal Cramps. Not taking.)    cholecalciferol (Vitamin D3) (1000 Units /25 mcg) tablet Take 1 Tab by mouth daily. (Patient taking differently: Take 1,000 Units by mouth daily. Not taking.)     No current facility-administered medications for this visit. Voice recognition software is utilized and this note may contain transcription errors     Tamy Ortiz is a 22 y.o. female being evaluated by a video visit encounter for concerns as above. A caregiver was present when appropriate. Due to this being a TeleHealth encounter (During QFVAW-18 public health emergency), evaluation of the following organ systems was limited: Vitals/Constitutional/EENT/Resp/CV/GI//MS/Neuro/Skin/Heme-Lymph-Imm. Pursuant to the emergency declaration under the [facility], 1135 waiver authority and the [employer] and [employer] General Act, this Virtual  Visit was conducted, with patient's (and/or legal guardian's) consent, to reduce the patient's risk of exposure to COVID-19 and provide necessary medical care. Services were provided through a video synchronous discussion virtually to substitute for in-person clinic visit.

## 2021-02-04 ENCOUNTER — VIRTUAL VISIT (OUTPATIENT)
Dept: INTERNAL MEDICINE CLINIC | Age: 26
End: 2021-02-04
Payer: MEDICAID

## 2021-02-04 DIAGNOSIS — E03.9 ACQUIRED HYPOTHYROIDISM: ICD-10-CM

## 2021-02-04 DIAGNOSIS — K21.9 GASTROESOPHAGEAL REFLUX DISEASE WITHOUT ESOPHAGITIS: ICD-10-CM

## 2021-02-04 DIAGNOSIS — J30.9 ALLERGIC RHINITIS, UNSPECIFIED SEASONALITY, UNSPECIFIED TRIGGER: Primary | ICD-10-CM

## 2021-02-04 DIAGNOSIS — R73.03 PREDIABETES: ICD-10-CM

## 2021-02-04 DIAGNOSIS — H92.23 OTORRHAGIA OF BOTH EARS: ICD-10-CM

## 2021-02-04 DIAGNOSIS — E28.2 PCOS (POLYCYSTIC OVARIAN SYNDROME): ICD-10-CM

## 2021-02-04 PROCEDURE — 99214 OFFICE O/P EST MOD 30 MIN: CPT | Performed by: INTERNAL MEDICINE

## 2021-02-04 RX ORDER — LEVOTHYROXINE SODIUM 100 UG/1
100 TABLET ORAL
Qty: 90 TAB | Refills: 3 | Status: SHIPPED | OUTPATIENT
Start: 2021-02-04 | End: 2021-10-05 | Stop reason: SDUPTHER

## 2021-02-04 RX ORDER — PANTOPRAZOLE SODIUM 40 MG/1
40 TABLET, DELAYED RELEASE ORAL DAILY
Qty: 90 TAB | Refills: 1 | Status: SHIPPED | OUTPATIENT
Start: 2021-02-04 | End: 2021-05-06

## 2021-02-04 RX ORDER — METFORMIN HYDROCHLORIDE 500 MG/1
500 TABLET ORAL 2 TIMES DAILY WITH MEALS
Qty: 180 TAB | Refills: 3 | Status: SHIPPED | OUTPATIENT
Start: 2021-02-04

## 2021-04-27 ENCOUNTER — OFFICE VISIT (OUTPATIENT)
Dept: INTERNAL MEDICINE CLINIC | Age: 26
End: 2021-04-27
Payer: MEDICAID

## 2021-04-27 VITALS
TEMPERATURE: 98.1 F | OXYGEN SATURATION: 98 % | WEIGHT: 179 LBS | RESPIRATION RATE: 14 BRPM | DIASTOLIC BLOOD PRESSURE: 74 MMHG | HEART RATE: 92 BPM | SYSTOLIC BLOOD PRESSURE: 110 MMHG | BODY MASS INDEX: 31.71 KG/M2 | HEIGHT: 63 IN

## 2021-04-27 DIAGNOSIS — M79.7 FIBROMYALGIA: ICD-10-CM

## 2021-04-27 DIAGNOSIS — M94.0 COSTOCHONDRITIS: Primary | ICD-10-CM

## 2021-04-27 PROCEDURE — 99214 OFFICE O/P EST MOD 30 MIN: CPT | Performed by: INTERNAL MEDICINE

## 2021-04-27 RX ORDER — METHYLPREDNISOLONE 4 MG/1
TABLET ORAL
Qty: 1 DOSE PACK | Refills: 0 | Status: SHIPPED | OUTPATIENT
Start: 2021-04-27 | End: 2021-05-18

## 2021-04-27 RX ORDER — LIDOCAINE 50 MG/G
PATCH TOPICAL EVERY 24 HOURS
COMMUNITY

## 2021-04-27 NOTE — PROGRESS NOTES
Assessment and Plan   Diagnoses and all orders for this visit:    1. Costochondritis  -     methylPREDNISolone (MEDROL DOSEPACK) 4 mg tablet; Use as directed on sheet  2. Fibromyalgia  Saw rheum since last visit and diagnosed with fibromyalgia. Started on lidocaine patches. Reports 2 weeks ago, she developed worsening generalized anterior and posterior rib pain but especially lower anterior ribs. Pain causes her to cough which causes her to vomit. Has had ongoing flares of this for the last 2 years, but this has lasted longer than usual.  No fever, chills, night sweats, unexpected weight loss. Not worse with movement. Tender to palpation on exam generally around chest and upper back. Lung exam normal.    Sx likely related to flare of costochondritis, chronic issue not stable. Has responded to steroids in the past, so will repeat. Advised to call if symptoms do not improve. Consider xray if no improvement. Benefits, risks, possible drug interactions, and side effects of all new medications were reviewed with the patient. Pt verbalized understanding. Return to clinic:  As needed    An electronic signature was used to authenticate this note. Rubia Simms MD  [facility]  4/27/2021    No future appointments. Subjective   Chief Complaint   Rib pain    Jane Tucker is a 32 y.o. female           Objective   Vitals:       Visit Vitals  /74 (BP 1 Location: Left upper arm, BP Patient Position: Sitting, BP Cuff Size: Adult)   Pulse 92   Temp 98.1 °F (36.7 °C) (Oral)   Resp 14   Ht 5' 3\" (1.6 m)   Wt 179 lb (81.2 kg)   SpO2 98%   BMI 31.71 kg/m²        Physical Exam  Constitutional:       Appearance: Normal appearance. She is not ill-appearing. Cardiovascular:      Rate and Rhythm: Normal rate and regular rhythm. Heart sounds: No murmur. No friction rub. No gallop. Pulmonary:      Effort: No respiratory distress.       Breath sounds: Normal breath sounds. No wheezing, rhonchi or rales. Musculoskeletal:      Comments: Mild tender generally anteriorly and posteriorly over ribs   Neurological:      Mental Status: She is alert. Current Outpatient Medications   Medication Sig    lidocaine (LIDODERM) 5 % by TransDERmal route every twenty-four (24) hours. Apply patch to the affected area for 12 hours a day and remove for 12 hours a day.  methylPREDNISolone (MEDROL DOSEPACK) 4 mg tablet Use as directed on sheet    metFORMIN (GLUCOPHAGE) 500 mg tablet Take 1 Tab by mouth two (2) times daily (with meals).  levothyroxine (SYNTHROID) 100 mcg tablet Take 1 Tab by mouth Daily (before breakfast).  pantoprazole (PROTONIX) 40 mg tablet Take 1 Tab by mouth daily.  Cetirizine 10 mg cap Take 10 mg by mouth daily. Indications: allergies     No current facility-administered medications for this visit.

## 2021-05-06 ENCOUNTER — VIRTUAL VISIT (OUTPATIENT)
Dept: INTERNAL MEDICINE CLINIC | Age: 26
End: 2021-05-06
Payer: MEDICARE

## 2021-05-06 DIAGNOSIS — M94.0 COSTOCHONDRITIS: ICD-10-CM

## 2021-05-06 DIAGNOSIS — K21.9 GASTROESOPHAGEAL REFLUX DISEASE WITHOUT ESOPHAGITIS: Primary | ICD-10-CM

## 2021-05-06 PROCEDURE — 99214 OFFICE O/P EST MOD 30 MIN: CPT | Performed by: INTERNAL MEDICINE

## 2021-05-06 RX ORDER — OMEPRAZOLE 40 MG/1
40 CAPSULE, DELAYED RELEASE ORAL DAILY
Qty: 60 CAP | Refills: 1 | Status: SHIPPED | OUTPATIENT
Start: 2021-05-06 | End: 2021-05-18 | Stop reason: DRUGHIGH

## 2021-05-06 NOTE — PROGRESS NOTES
Consent: Chidi Snell, who was seen by synchronous (real-time) audio-video technology, and/or her healthcare decision maker, is aware that this patient-initiated, Telehealth encounter on 5/6/2021 is a billable service, with coverage as determined by her insurance carrier. She is aware that she may receive a bill and has provided verbal consent to proceed: Yes. I was in the office while conducting this encounter. Patient identification was verified at the start of the visit: YES  This visit was done with doxy. me  The patient is located in [place] during this visit    Assessment and Plan     History of fibromyalgia, endometriosis, GERD, hypothyroidism, HLD, prediabetes, history of syncope, PCOS    1. Gastroesophageal reflux disease without esophagitis  Assessment & Plan:  Rib pain noted last visit has since resolved. However, she continues to have chest pressure, burning sensation, and reflux. No alleviating or aggravating factors. No association with food or activity. Has been pretty much constant at the same level for the past month. No difficulty swallowing, pain with swallowing, blood in her stools, fever, chills, night sweats. Stop pantoprazole and start omeprazole twice a day. Recommend in-person evaluation in 2 weeks to rule out cardiac causes as well. Orders:  -     omeprazole (PRILOSEC) 40 mg capsule; Take 1 Cap by mouth daily. , Normal, Disp-60 Cap, R-1  2. Costochondritis  Assessment & Plan:  Resolved since last visit    We discussed the expected course, resolution and complications of the diagnosis(es) in detail. Medication risks, benefits, costs, interactions, and alternatives were discussed as indicated. I advised her to contact the office if her condition worsens, changes or fails to improve as anticipated. She expressed understanding with the diagnosis(es) and plan.      Return to clinic: Later this month for in person evaluation    Dewey Jesus MD  [facility]  5/6/2021    Future Appointments   Date Time Provider Rosalind Callejas   1/89/3258 27:74 AM Osbaldo Diaz MD [facility] BS AMB      Chronic illness with progression with prescription drug management  Subjective   Chief Complaint   Worsening reflux    Belkis Solorzano is a 32 y.o. female         Objective   Vitals:       Patient-Reported Vitals 9/28/2020   Patient-Reported Weight 184.0lb                 Physical Exam  Constitutional:       Appearance: Normal appearance. She is not ill-appearing. Pulmonary:      Effort: No respiratory distress. Neurological:      Mental Status: She is alert. Current Outpatient Medications   Medication Sig    omeprazole (PRILOSEC) 40 mg capsule Take 1 Cap by mouth daily.  lidocaine (LIDODERM) 5 % by TransDERmal route every twenty-four (24) hours. Apply patch to the affected area for 12 hours a day and remove for 12 hours a day.  methylPREDNISolone (MEDROL DOSEPACK) 4 mg tablet Use as directed on sheet    metFORMIN (GLUCOPHAGE) 500 mg tablet Take 1 Tab by mouth two (2) times daily (with meals).  levothyroxine (SYNTHROID) 100 mcg tablet Take 1 Tab by mouth Daily (before breakfast).  Cetirizine 10 mg cap Take 10 mg by mouth daily. Indications: allergies     No current facility-administered medications for this visit. Belkis Solorzano is a 32 y.o. female being evaluated by a video visit encounter for concerns as above. A caregiver was present when appropriate. Due to this being a TeleHealth encounter (During [facility]- public health emergency), evaluation of the following organ systems was limited: Vitals/Constitutional/EENT/Resp/CV/GI//MS/Neuro/Skin/Heme-Lymph-Imm.   Pursuant to the emergency declaration under the [facility], 1135 waiver authority and the [employer] Act, this Virtual  Visit was conducted, with patient's (and/or legal guardian's) consent, to reduce the patient's risk of exposure to COVID-19 and provide necessary medical care. Services were provided through a video synchronous discussion virtually to substitute for in-person clinic visit.

## 2021-05-06 NOTE — ASSESSMENT & PLAN NOTE
Rib pain noted last visit has since resolved. However, she continues to have chest pressure, burning sensation, and reflux. No alleviating or aggravating factors. No association with food or activity. Has been pretty much constant at the same level for the past month. No difficulty swallowing, pain with swallowing, blood in her stools, fever, chills, night sweats. Stop pantoprazole and start omeprazole twice a day. Recommend in-person evaluation in 2 weeks to rule out cardiac causes as well.

## 2021-05-18 ENCOUNTER — OFFICE VISIT (OUTPATIENT)
Dept: INTERNAL MEDICINE CLINIC | Age: 26
End: 2021-05-18
Payer: MEDICAID

## 2021-05-18 VITALS
BODY MASS INDEX: 31.93 KG/M2 | HEIGHT: 63 IN | DIASTOLIC BLOOD PRESSURE: 82 MMHG | RESPIRATION RATE: 14 BRPM | SYSTOLIC BLOOD PRESSURE: 116 MMHG | HEART RATE: 80 BPM | TEMPERATURE: 97.1 F | WEIGHT: 180.2 LBS | OXYGEN SATURATION: 97 %

## 2021-05-18 DIAGNOSIS — M94.0 COSTOCHONDRITIS: ICD-10-CM

## 2021-05-18 DIAGNOSIS — E66.9 OBESITY (BMI 30-39.9): ICD-10-CM

## 2021-05-18 DIAGNOSIS — K21.9 GASTROESOPHAGEAL REFLUX DISEASE WITHOUT ESOPHAGITIS: Primary | ICD-10-CM

## 2021-05-18 DIAGNOSIS — E03.9 ACQUIRED HYPOTHYROIDISM: ICD-10-CM

## 2021-05-18 DIAGNOSIS — R07.89 CHEST TIGHTNESS: ICD-10-CM

## 2021-05-18 DIAGNOSIS — R10.13 EPIGASTRIC PAIN: ICD-10-CM

## 2021-05-18 LAB
ALBUMIN SERPL-MCNC: 4.2 G/DL (ref 3.5–5)
ALBUMIN/GLOB SERPL: 1.4 {RATIO} (ref 1.1–2.2)
ALP SERPL-CCNC: 87 U/L (ref 45–117)
ALT SERPL-CCNC: 129 U/L (ref 12–78)
ANION GAP SERPL CALC-SCNC: 8 MMOL/L (ref 5–15)
AST SERPL-CCNC: 55 U/L (ref 15–37)
BASOPHILS # BLD: 0 K/UL (ref 0–0.1)
BASOPHILS NFR BLD: 1 % (ref 0–1)
BILIRUB SERPL-MCNC: 0.5 MG/DL (ref 0.2–1)
BUN SERPL-MCNC: 10 MG/DL (ref 6–20)
BUN/CREAT SERPL: 15 (ref 12–20)
CALCIUM SERPL-MCNC: 9.6 MG/DL (ref 8.5–10.1)
CHLORIDE SERPL-SCNC: 104 MMOL/L (ref 97–108)
CHOLEST SERPL-MCNC: 326 MG/DL
CO2 SERPL-SCNC: 27 MMOL/L (ref 21–32)
COMMENT, HOLDF: NORMAL
CREAT SERPL-MCNC: 0.67 MG/DL (ref 0.55–1.02)
DIFFERENTIAL METHOD BLD: NORMAL
EOSINOPHIL # BLD: 0.1 K/UL (ref 0–0.4)
EOSINOPHIL NFR BLD: 1 % (ref 0–7)
ERYTHROCYTE [DISTWIDTH] IN BLOOD BY AUTOMATED COUNT: 13.2 % (ref 11.5–14.5)
EST. AVERAGE GLUCOSE BLD GHB EST-MCNC: 108 MG/DL
GLOBULIN SER CALC-MCNC: 3.1 G/DL (ref 2–4)
GLUCOSE SERPL-MCNC: 96 MG/DL (ref 65–100)
HBA1C MFR BLD: 5.4 % (ref 4–5.6)
HCT VFR BLD AUTO: 39.4 % (ref 35–47)
HDLC SERPL-MCNC: 55 MG/DL
HDLC SERPL: 5.9 {RATIO} (ref 0–5)
HGB BLD-MCNC: 12.8 G/DL (ref 11.5–16)
IMM GRANULOCYTES # BLD AUTO: 0 K/UL (ref 0–0.04)
IMM GRANULOCYTES NFR BLD AUTO: 0 % (ref 0–0.5)
LDLC SERPL CALC-MCNC: 230.4 MG/DL (ref 0–100)
LYMPHOCYTES # BLD: 2.6 K/UL (ref 0.8–3.5)
LYMPHOCYTES NFR BLD: 42 % (ref 12–49)
MCH RBC QN AUTO: 28.7 PG (ref 26–34)
MCHC RBC AUTO-ENTMCNC: 32.5 G/DL (ref 30–36.5)
MCV RBC AUTO: 88.3 FL (ref 80–99)
MONOCYTES # BLD: 0.4 K/UL (ref 0–1)
MONOCYTES NFR BLD: 7 % (ref 5–13)
NEUTS SEG # BLD: 3.1 K/UL (ref 1.8–8)
NEUTS SEG NFR BLD: 49 % (ref 32–75)
NRBC # BLD: 0 K/UL (ref 0–0.01)
NRBC BLD-RTO: 0 PER 100 WBC
PLATELET # BLD AUTO: 222 K/UL (ref 150–400)
PMV BLD AUTO: 11.9 FL (ref 8.9–12.9)
POTASSIUM SERPL-SCNC: 4.1 MMOL/L (ref 3.5–5.1)
PROT SERPL-MCNC: 7.3 G/DL (ref 6.4–8.2)
RBC # BLD AUTO: 4.46 M/UL (ref 3.8–5.2)
SAMPLES BEING HELD,HOLD: NORMAL
SODIUM SERPL-SCNC: 139 MMOL/L (ref 136–145)
TRIGL SERPL-MCNC: 203 MG/DL (ref ?–150)
TSH SERPL DL<=0.05 MIU/L-ACNC: 1.18 UIU/ML (ref 0.36–3.74)
VLDLC SERPL CALC-MCNC: 40.6 MG/DL
WBC # BLD AUTO: 6.3 K/UL (ref 3.6–11)

## 2021-05-18 PROCEDURE — G8432 DEP SCR NOT DOC, RNG: HCPCS | Performed by: INTERNAL MEDICINE

## 2021-05-18 PROCEDURE — G8427 DOCREV CUR MEDS BY ELIG CLIN: HCPCS | Performed by: INTERNAL MEDICINE

## 2021-05-18 PROCEDURE — 93000 ELECTROCARDIOGRAM COMPLETE: CPT | Performed by: INTERNAL MEDICINE

## 2021-05-18 PROCEDURE — 99214 OFFICE O/P EST MOD 30 MIN: CPT | Performed by: INTERNAL MEDICINE

## 2021-05-18 PROCEDURE — G8417 CALC BMI ABV UP PARAM F/U: HCPCS | Performed by: INTERNAL MEDICINE

## 2021-05-18 RX ORDER — FAMOTIDINE 40 MG/1
40 TABLET, FILM COATED ORAL DAILY
Qty: 30 TAB | Refills: 1 | Status: SHIPPED | OUTPATIENT
Start: 2021-05-18 | End: 2021-06-10

## 2021-05-18 RX ORDER — METHYLPREDNISOLONE 4 MG/1
TABLET ORAL
Qty: 1 DOSE PACK | Refills: 0 | Status: SHIPPED | OUTPATIENT
Start: 2021-05-18 | End: 2021-06-01

## 2021-05-18 RX ORDER — OMEPRAZOLE 40 MG/1
40 CAPSULE, DELAYED RELEASE ORAL 2 TIMES DAILY
Qty: 60 CAP | Refills: 1 | Status: SHIPPED | OUTPATIENT
Start: 2021-05-18 | End: 2021-07-07

## 2021-05-18 NOTE — LETTER
NOTIFICATION RETURN TO WORK / SCHOOL 
 
5/18/2021 10:06 AM 
 
Ms. Remy Diaz [id number] To Whom It May Concern: 
 
Batsheva Guerrero is currently under the care of [address] [place] [address],[address]. She will return to work/school on: 5/22/21 If there are questions or concerns please have the patient contact our office.  
 
 
 
Sincerely, 
 
 
Ileana Hewitt MD

## 2021-05-18 NOTE — PROGRESS NOTES
Assessment and Plan     1. Gastroesophageal reflux disease without esophagitis  Assessment & Plan:  Last visit: \"Rib pain noted last visit has since resolved. However, she continues to have chest pressure, burning sensation, and reflux. No alleviating or aggravating factors. No association with food or activity. Has been pretty much constant at the same level for the past month. No difficulty swallowing, pain with swallowing, blood in her stools, fever, chills, night sweats. Stop pantoprazole and start omeprazole twice a day. Recommend in-person evaluation in 2 weeks to rule out cardiac causes as well. \"    Prescription was inadvertently written for omeprazole once a day so she has only been taking it once a day. She is taking it appropriately. Has not noticed a significant difference with change in medication. Her cough is back and has also now developed rib pain. Now reports epigastric pain with eating. Continues to have nausea with eating although this is a chronic issue for her. GI referral.  Increase omeprazole to twice a day. Add famotidine daily. Cough likely related to poorly controlled GERD. Orders:  -     H PYLORI AB, IGA; Future  -     H PYLORI AB, IGG, QT; Future  -     H PYLORI AB, IGM; Future  -     REFERRAL TO GASTROENTEROLOGY  -     omeprazole (PRILOSEC) 40 mg capsule; Take 1 Cap by mouth two (2) times a day. Indications: reflux, Normal, Disp-60 Cap, R-1  -     famotidine (PEPCID) 40 mg tablet; Take 1 Tab by mouth daily. Indications: reflux, Normal, Disp-30 Tab, R-1  2. Epigastric pain  Assessment & Plan:  New since last visit. Recommend checking H. pylori. GI referral  Orders:  -     METABOLIC PANEL, COMPREHENSIVE; Future  -     CBC WITH AUTOMATED DIFF; Future  3. Chest tightness  Assessment & Plan:  EKG with normal sinus rhythm, normal T waves, no pathologic Q waves, no ST depression or elevation. Chest tightness likely related to uncontrolled GERD. Monitor.   If no improvement with better reflux, recommend further work-up  Orders:  -     AMB POC EKG ROUTINE W/ 12 LEADS, INTER & REP  4. Costochondritis  Assessment & Plan:  Cough and subsequent rib pain has returned since last visit. Rib pain likely related to costochondritis related to frequent coughing related to uncontrolled GERD. Responded well to steroids in the past so will redo Medrol Dosepak. Orders:  -     methylPREDNISolone (MEDROL DOSEPACK) 4 mg tablet; Take as directed on pack, Normal, Disp-1 Dose Pack, R-0  5. Acquired hypothyroidism  -     TSH 3RD GENERATION; Future  6. Obesity (BMI 30-39. 9)  Assessment & Plan:  Interested in discussing weight loss. Referral to dietitian provided  Orders:  -     [address]; Future  -     HEMOGLOBIN A1C WITH EAG; Future       Benefits, risks, possible drug interactions, and side effects of all new medications were reviewed with the patient. Pt verbalized understanding. Return to clinic: After GI     An electronic signature was used to authenticate this note. Chrissie Lopez MD  [facility]  5/18/2021    Future Appointments   Date Time Provider Rosalind Callejas   6/3/2859  9:15 PM Silvia Li MD [facility] BS AMB        Subjective   Chief Complaint   Reflux    Rebeca Mo is a 32 y.o. female           Objective   Vitals:       Visit Vitals  /82 (BP 1 Location: Left upper arm, BP Patient Position: Sitting, BP Cuff Size: Adult)   Pulse 80   Temp 97.1 °F (36.2 °C) (Oral)   Resp 14   Ht 5' 3\" (1.6 m)   Wt 180 lb 3.2 oz (81.7 kg)   SpO2 97%   BMI 31.92 kg/m²        Physical Exam  Constitutional:       Appearance: Normal appearance. She is not ill-appearing. Cardiovascular:      Rate and Rhythm: Normal rate and regular rhythm. Heart sounds: No murmur. No friction rub. No gallop. Pulmonary:      Effort: No respiratory distress. Breath sounds: Normal breath sounds.  No wheezing, rhonchi or rales. Chest:      Comments: Mild to moderate tenderness lower ribs bilaterally anteriorly  Abdominal:      General: Bowel sounds are normal. There is no distension. Palpations: Abdomen is soft. There is no mass. Tenderness: There is no abdominal tenderness. There is no guarding. Neurological:      Mental Status: She is alert. Current Outpatient Medications   Medication Sig    omeprazole (PRILOSEC) 40 mg capsule Take 1 Cap by mouth two (2) times a day. Indications: reflux    famotidine (PEPCID) 40 mg tablet Take 1 Tab by mouth daily. Indications: reflux    methylPREDNISolone (MEDROL DOSEPACK) 4 mg tablet Take as directed on pack    lidocaine (LIDODERM) 5 % by TransDERmal route every twenty-four (24) hours. Apply patch to the affected area for 12 hours a day and remove for 12 hours a day.  metFORMIN (GLUCOPHAGE) 500 mg tablet Take 1 Tab by mouth two (2) times daily (with meals).  levothyroxine (SYNTHROID) 100 mcg tablet Take 1 Tab by mouth Daily (before breakfast).  Cetirizine 10 mg cap Take 10 mg by mouth daily. Indications: allergies     No current facility-administered medications for this visit.

## 2021-05-18 NOTE — ASSESSMENT & PLAN NOTE
Last visit: \"Rib pain noted last visit has since resolved. However, she continues to have chest pressure, burning sensation, and reflux. No alleviating or aggravating factors. No association with food or activity. Has been pretty much constant at the same level for the past month. No difficulty swallowing, pain with swallowing, blood in her stools, fever, chills, night sweats. Stop pantoprazole and start omeprazole twice a day. Recommend in-person evaluation in 2 weeks to rule out cardiac causes as well. \"    Prescription was inadvertently written for omeprazole once a day so she has only been taking it once a day. She is taking it appropriately. Has not noticed a significant difference with change in medication. Her cough is back and has also now developed rib pain. Now reports epigastric pain with eating. Continues to have nausea with eating although this is a chronic issue for her. GI referral.  Increase omeprazole to twice a day. Add famotidine daily. Cough likely related to poorly controlled GERD.

## 2021-05-19 LAB
H PYLORI IGA SER-ACNC: <9 UNITS (ref 0–8.9)
H PYLORI IGG SER IA-ACNC: 0.1 INDEX VALUE (ref 0–0.79)
H PYLORI IGM SER-ACNC: <9 UNITS (ref 0–8.9)

## 2021-05-19 NOTE — ASSESSMENT & PLAN NOTE
Cough and subsequent rib pain has returned since last visit. Rib pain likely related to costochondritis related to frequent coughing related to uncontrolled GERD. Responded well to steroids in the past so will redo Medrol Dosepak.

## 2021-05-19 NOTE — ASSESSMENT & PLAN NOTE
EKG with normal sinus rhythm, normal T waves, no pathologic Q waves, no ST depression or elevation. Chest tightness likely related to uncontrolled GERD. Monitor.   If no improvement with better reflux, recommend further work-up

## 2021-05-23 NOTE — PROGRESS NOTES
Letter sent. H. pylori negative. Thyroid studies normal.  A1c normal.  . CBC normal.  ALT and  and 55. After normalizing previously. Elevated LFTs likely related to metabolic syndrome. Plan to discuss restarting statin at next visit and rechecking. Follow-up with GI as discussed.

## 2021-06-01 ENCOUNTER — OFFICE VISIT (OUTPATIENT)
Dept: INTERNAL MEDICINE CLINIC | Age: 26
End: 2021-06-01
Payer: MEDICARE

## 2021-06-01 VITALS
TEMPERATURE: 98.2 F | HEIGHT: 63 IN | SYSTOLIC BLOOD PRESSURE: 116 MMHG | HEART RATE: 87 BPM | RESPIRATION RATE: 14 BRPM | DIASTOLIC BLOOD PRESSURE: 82 MMHG | OXYGEN SATURATION: 98 % | BODY MASS INDEX: 32.28 KG/M2 | WEIGHT: 182.2 LBS

## 2021-06-01 DIAGNOSIS — K21.9 GASTROESOPHAGEAL REFLUX DISEASE WITHOUT ESOPHAGITIS: Primary | ICD-10-CM

## 2021-06-01 DIAGNOSIS — E78.00 HYPERCHOLESTEROLEMIA: ICD-10-CM

## 2021-06-01 DIAGNOSIS — R74.8 ELEVATED LIVER ENZYMES: ICD-10-CM

## 2021-06-01 DIAGNOSIS — G43.809 OTHER MIGRAINE WITHOUT STATUS MIGRAINOSUS, NOT INTRACTABLE: ICD-10-CM

## 2021-06-01 PROBLEM — G43.909 MIGRAINE: Status: ACTIVE | Noted: 2020-02-25

## 2021-06-01 PROCEDURE — 99214 OFFICE O/P EST MOD 30 MIN: CPT | Performed by: INTERNAL MEDICINE

## 2021-06-01 PROCEDURE — G8417 CALC BMI ABV UP PARAM F/U: HCPCS | Performed by: INTERNAL MEDICINE

## 2021-06-01 PROCEDURE — G8510 SCR DEP NEG, NO PLAN REQD: HCPCS | Performed by: INTERNAL MEDICINE

## 2021-06-01 PROCEDURE — G8427 DOCREV CUR MEDS BY ELIG CLIN: HCPCS | Performed by: INTERNAL MEDICINE

## 2021-06-01 RX ORDER — ROSUVASTATIN CALCIUM 20 MG/1
20 TABLET, COATED ORAL
Qty: 90 TABLET | Refills: 1 | Status: SHIPPED | OUTPATIENT
Start: 2021-06-01 | End: 2021-10-05 | Stop reason: SDUPTHER

## 2021-06-01 RX ORDER — SUMATRIPTAN 50 MG/1
50 TABLET, FILM COATED ORAL
Qty: 10 TABLET | Refills: 1 | Status: SHIPPED | OUTPATIENT
Start: 2021-06-01 | End: 2021-06-01

## 2021-06-01 RX ORDER — SUCRALFATE 1 G/10ML
1 SUSPENSION ORAL 4 TIMES DAILY
Qty: 1200 ML | Refills: 1 | Status: SHIPPED | OUTPATIENT
Start: 2021-06-01 | End: 2021-06-04

## 2021-06-01 NOTE — ASSESSMENT & PLAN NOTE
Notes worsening migraines recently. Her glasses have been taking into the side of her head and she is planning on seeing the eye doctor soon. In order to avoid irritation to her stomach, recommend trying Imitrex to avoid NSAIDs.   Follow-up with the eye doctor

## 2021-06-01 NOTE — ASSESSMENT & PLAN NOTE
Still having burning sensation despite changes made last time. Not vomiting as much. H. pylori negative. Recommend following up with GI. Continue omeprazole and famotidine. Add sucralfate. Lifestyle modifications discussed. Avoid using NSAIDs. Patient asking to be tested for acute hepatic porphyria.   Consider appropriateness of testing at another visit

## 2021-06-01 NOTE — ASSESSMENT & PLAN NOTE
Possibly related to metabolic syndrome especially given worsening off of statin.   Restart statin and recheck labs in 3 months

## 2021-06-01 NOTE — PROGRESS NOTES
Note   Chief Complaint   Burning sensation    Olegario Beasley is a 32 y.o. female     1. Gastroesophageal reflux disease without esophagitis  Assessment & Plan:  Still having burning sensation despite changes made last time. Not vomiting as much. H. pylori negative. Recommend following up with GI. Continue omeprazole and famotidine. Add sucralfate. Lifestyle modifications discussed. Avoid using NSAIDs. Patient asking to be tested for acute hepatic porphyria. Consider appropriateness of testing at another visit  Orders:  -     sucralfate (CARAFATE) 100 mg/mL suspension; Take 10 mL by mouth four (4) times daily for 30 days. , Normal, Disp-1200 mL, R-1  2. Elevated liver enzymes  Assessment & Plan:  Possibly related to metabolic syndrome especially given worsening off of statin. Restart statin and recheck labs in 3 months  3. Hypercholesterolemia  Assessment & Plan:  Severely elevated. Start rosuvastatin 20 mg and recheck in 3 months. Orders:  -     rosuvastatin (CRESTOR) 20 mg tablet; Take 1 Tablet by mouth nightly. Indications: high cholesterol, Normal, Disp-90 Tablet, R-1  4. Other migraine without status migrainosus, not intractable  Assessment & Plan:  Notes worsening migraines recently. Her glasses have been taking into the side of her head and she is planning on seeing the eye doctor soon. In order to avoid irritation to her stomach, recommend trying Imitrex to avoid NSAIDs. Follow-up with the eye doctor  Orders:  -     SUMAtriptan (IMITREX) 50 mg tablet; Take 1 Tablet by mouth once as needed for Migraine for up to 1 dose. Okay to take a 2nd dose in 1 hour if needed for a max of 2 doses/day, Normal, Disp-10 Tablet, R-1       Benefits, risks, possible drug interactions, and side effects of all new medications were reviewed with the patient. Pt verbalized understanding.     Return to clinic: After GI for GERD, recheck cholesterol and LFTs, got her Covid vaccine today but I am not sure which one and when her first 1 was    An electronic signature was used to authenticate this note. Dewey Jesus MD  [facility]  6/1/2021    No future appointments. Objective   Vitals:       Visit Vitals  /82 (BP 1 Location: Left upper arm, BP Patient Position: Sitting, BP Cuff Size: Adult)   Pulse 87   Temp 98.2 °F (36.8 °C) (Oral)   Resp 14   Ht 5' 3\" (1.6 m)   Wt 182 lb 3.2 oz (82.6 kg)   SpO2 98%   BMI 32.28 kg/m²        Physical Exam  Constitutional:       Appearance: Normal appearance. She is not ill-appearing. Cardiovascular:      Rate and Rhythm: Normal rate and regular rhythm. Heart sounds: No murmur heard. No friction rub. No gallop. Pulmonary:      Effort: No respiratory distress. Breath sounds: Normal breath sounds. No wheezing, rhonchi or rales. Abdominal:      General: Bowel sounds are normal. There is no distension. Palpations: Abdomen is soft. There is no mass. Tenderness: There is abdominal tenderness (Mild diffuse). There is no guarding. Neurological:      Mental Status: She is alert. Current Outpatient Medications   Medication Sig    aspirin/acetaminophen/caffeine (EXCEDRIN MIGRAINE PO) Take  by mouth.  ibuprofen 100 mg tablet Take 100 mg by mouth every six (6) hours as needed for Pain.  sucralfate (CARAFATE) 100 mg/mL suspension Take 10 mL by mouth four (4) times daily for 30 days.  rosuvastatin (CRESTOR) 20 mg tablet Take 1 Tablet by mouth nightly. Indications: high cholesterol    SUMAtriptan (IMITREX) 50 mg tablet Take 1 Tablet by mouth once as needed for Migraine for up to 1 dose. Okay to take a 2nd dose in 1 hour if needed for a max of 2 doses/day    omeprazole (PRILOSEC) 40 mg capsule Take 1 Cap by mouth two (2) times a day. Indications: reflux    famotidine (PEPCID) 40 mg tablet Take 1 Tab by mouth daily.  Indications: reflux    lidocaine (LIDODERM) 5 % by TransDERmal route every twenty-four (24) hours. Apply patch to the affected area for 12 hours a day and remove for 12 hours a day.  metFORMIN (GLUCOPHAGE) 500 mg tablet Take 1 Tab by mouth two (2) times daily (with meals).  levothyroxine (SYNTHROID) 100 mcg tablet Take 1 Tab by mouth Daily (before breakfast). No current facility-administered medications for this visit.

## 2021-06-02 ENCOUNTER — DOCUMENTATION ONLY (OUTPATIENT)
Dept: INTERNAL MEDICINE CLINIC | Age: 26
End: 2021-06-02

## 2021-06-02 NOTE — PROGRESS NOTES
Key: [id number] [facility] Ashok DICKERSON Case ID: [id number] - Rx #: [id number]  Status  Sent to [facility]  Drug  Sucralfate 1GM/10ML suspension  Form  [employer] Electronic

## 2021-06-04 ENCOUNTER — TELEPHONE (OUTPATIENT)
Dept: INTERNAL MEDICINE CLINIC | Age: 26
End: 2021-06-04

## 2021-06-04 DIAGNOSIS — K21.9 GASTROESOPHAGEAL REFLUX DISEASE WITHOUT ESOPHAGITIS: ICD-10-CM

## 2021-06-04 RX ORDER — SUCRALFATE 1 G/10ML
1 SUSPENSION ORAL 4 TIMES DAILY
Qty: 1200 ML | Refills: 1 | Status: SHIPPED | OUTPATIENT
Start: 2021-06-04 | End: 2021-06-08 | Stop reason: CLARIF

## 2021-06-04 NOTE — TELEPHONE ENCOUNTER
When calling the pharmacy to see if medication went through. Pharmacist stated that it was denied and PA still needed. I will complete PA once it is sent to us via fax.

## 2021-06-04 NOTE — TELEPHONE ENCOUNTER
Per incoming message from cover my meds Canceling generic Carafate PA case. Plan prefers the brand. Please send in the brand to see if that will be covered by plan.

## 2021-06-08 DIAGNOSIS — K21.9 GASTROESOPHAGEAL REFLUX DISEASE WITHOUT ESOPHAGITIS: Primary | ICD-10-CM

## 2021-06-08 RX ORDER — SUCRALFATE 1 G/1
1 TABLET ORAL 4 TIMES DAILY
Qty: 120 TABLET | Refills: 3 | Status: SHIPPED | OUTPATIENT
Start: 2021-06-08 | End: 2021-10-05

## 2021-06-09 ENCOUNTER — TELEPHONE (OUTPATIENT)
Dept: INTERNAL MEDICINE CLINIC | Age: 26
End: 2021-06-09

## 2021-06-09 DIAGNOSIS — K21.9 GASTROESOPHAGEAL REFLUX DISEASE WITHOUT ESOPHAGITIS: ICD-10-CM

## 2021-06-10 RX ORDER — FAMOTIDINE 40 MG/1
40 TABLET, FILM COATED ORAL DAILY
Qty: 30 TABLET | Refills: 1 | Status: SHIPPED | OUTPATIENT
Start: 2021-06-10 | End: 2021-07-07

## 2021-06-23 ENCOUNTER — VIRTUAL VISIT (OUTPATIENT)
Dept: INTERNAL MEDICINE CLINIC | Age: 26
End: 2021-06-23
Payer: MEDICARE

## 2021-06-23 DIAGNOSIS — K21.9 GASTROESOPHAGEAL REFLUX DISEASE WITHOUT ESOPHAGITIS: ICD-10-CM

## 2021-06-23 DIAGNOSIS — J01.00 ACUTE NON-RECURRENT MAXILLARY SINUSITIS: Primary | ICD-10-CM

## 2021-06-23 PROCEDURE — G8417 CALC BMI ABV UP PARAM F/U: HCPCS | Performed by: INTERNAL MEDICINE

## 2021-06-23 PROCEDURE — G8427 DOCREV CUR MEDS BY ELIG CLIN: HCPCS | Performed by: INTERNAL MEDICINE

## 2021-06-23 PROCEDURE — 99213 OFFICE O/P EST LOW 20 MIN: CPT | Performed by: INTERNAL MEDICINE

## 2021-06-23 PROCEDURE — G8432 DEP SCR NOT DOC, RNG: HCPCS | Performed by: INTERNAL MEDICINE

## 2021-06-23 RX ORDER — AMOXICILLIN AND CLAVULANATE POTASSIUM 875; 125 MG/1; MG/1
1 TABLET, FILM COATED ORAL EVERY 12 HOURS
Qty: 10 TABLET | Refills: 0 | Status: SHIPPED | OUTPATIENT
Start: 2021-06-23 | End: 2021-06-28

## 2021-06-23 NOTE — ASSESSMENT & PLAN NOTE
Reports 1 week history of worsening sinus congestion, drainage, throat pain, cough, shortness of breath, muscle aches, fatigue, slightly decreased sense of smell, trouble swallowing, postnasal drip. Also notes decreased hearing of her left ear. No fever, chills. Recommend Augmentin for sinusitis/bronchitis. Given some of her symptoms, recommend Covid testing (she has been vaccinated). Also recommend Chloraseptic spray over-the-counter for sore throat.   Advised to call if symptoms do not improve

## 2021-06-23 NOTE — PROGRESS NOTES
Consent: Hao Pacheco, who was seen by synchronous (real-time) audio-video technology, and/or her healthcare decision maker, is aware that this patient-initiated, Telehealth encounter on 6/23/2021 is a billable service, with coverage as determined by her insurance carrier. She is aware that she may receive a bill and has provided verbal consent to proceed: Yes. I was in the office while conducting this encounter. Patient identification was verified at the start of the visit: YES  This visit was done with doxy. me  The patient is located in [place] during this visit    Note   Chief Complaint   Sinus infection    Hao Pacheco is a 32 y.o. female     1. Acute non-recurrent maxillary sinusitis  Assessment & Plan:  Reports 1 week history of worsening sinus congestion, drainage, throat pain, cough, shortness of breath, muscle aches, fatigue, slightly decreased sense of smell, trouble swallowing, postnasal drip. Also notes decreased hearing of her left ear. No fever, chills. Recommend Augmentin for sinusitis/bronchitis. Given some of her symptoms, recommend Covid testing (she has been vaccinated). Also recommend Chloraseptic spray over-the-counter for sore throat. Advised to call if symptoms do not improve  Orders:  -     amoxicillin-clavulanate (AUGMENTIN) 875-125 mg per tablet; Take 1 Tablet by mouth every twelve (12) hours for 5 days. , Normal, Disp-10 Tablet, R-0  2. Gastroesophageal reflux disease without esophagitis  Assessment & Plan:  Has not been able to establish with a GI doctor in [facility] yet. No significant change in symptoms  Continue famotidine 40 daily, omeprazole 40mg bid, and sucralfate 1g QID, no changes recommended. We discussed the expected course, resolution and complications of the diagnosis(es) in detail. Medication risks, benefits, costs, interactions, and alternatives were discussed as indicated.   I advised her to contact the office if her condition worsens, changes or fails to improve as anticipated. She expressed understanding with the diagnosis(es) and plan. Return to clinic: As needed    Wing Farida MD  [facility]  6/23/2021    Future Appointments   Date Time Provider Rosalind Callejas   7/20/2021 10:30 AM Swetha Polanco RD [facility]        Objective   Vitals:       Patient-Reported Vitals 6/23/2021   Patient-Reported Weight 172LB                 Physical Exam  Constitutional:       Appearance: Normal appearance. She is not ill-appearing. HENT:      Nose:      Comments: Nasally voice  Pulmonary:      Effort: No respiratory distress. Neurological:      Mental Status: She is alert. Current Outpatient Medications   Medication Sig    amoxicillin-clavulanate (AUGMENTIN) 875-125 mg per tablet Take 1 Tablet by mouth every twelve (12) hours for 5 days.  famotidine (PEPCID) 40 mg tablet TAKE 1 TAB BY MOUTH DAILY. INDICATIONS: REFLUX    sucralfate (Carafate) 1 gram tablet Take 1 Tablet by mouth four (4) times daily.  aspirin/acetaminophen/caffeine (EXCEDRIN MIGRAINE PO) Take  by mouth.  ibuprofen 100 mg tablet Take 100 mg by mouth every six (6) hours as needed for Pain.  rosuvastatin (CRESTOR) 20 mg tablet Take 1 Tablet by mouth nightly. Indications: high cholesterol    omeprazole (PRILOSEC) 40 mg capsule Take 1 Cap by mouth two (2) times a day. Indications: reflux    lidocaine (LIDODERM) 5 % by TransDERmal route every twenty-four (24) hours. Apply patch to the affected area for 12 hours a day and remove for 12 hours a day.  metFORMIN (GLUCOPHAGE) 500 mg tablet Take 1 Tab by mouth two (2) times daily (with meals).  levothyroxine (SYNTHROID) 100 mcg tablet Take 1 Tab by mouth Daily (before breakfast). No current facility-administered medications for this visit. Hao Pacheco is a 32 y.o. female being evaluated by a video visit encounter for concerns as above.   A caregiver was present when appropriate. Due to this being a TeleHealth encounter (During AWBGT-45 public health emergency), evaluation of the following organ systems was limited: Vitals/Constitutional/EENT/Resp/CV/GI//MS/Neuro/Skin/Heme-Lymph-Imm. Pursuant to the emergency declaration under the [address] [place] [address], [facility] waiver authority and the [employer] and [employer] Act, this Virtual  Visit was conducted, with patient's (and/or legal guardian's) consent, to reduce the patient's risk of exposure to COVID-19 and provide necessary medical care. Services were provided through a video synchronous discussion virtually to substitute for in-person clinic visit.

## 2021-06-23 NOTE — ASSESSMENT & PLAN NOTE
Has not been able to establish with a GI doctor in [facility] yet. No significant change in symptoms  Continue famotidine 40 daily, omeprazole 40mg bid, and sucralfate 1g QID, no changes recommended.

## 2021-06-28 NOTE — LETTER
5/23/2021 Ms. Remy Diaz [id number] Dear Ms. Nuzhat Singh, Your test results are normal except for the following: Your cholesterol and liver numbers are elevated. We will discuss this at your visit on 6/1/2021. If you have any questions or concerns regarding your tests or our visit, please feel free to give our office a call at [phone], and we will get back with you as soon as we can. Sincerely, 
Greyson Lax 
 
----------------------------------------------- Lab Legend - Your blood work was tailored based on your medical history. You may not have had all of these labs done. WBC - white blood cell count, the cells that fight off infection HGB - hemoglobin or red blood cell count, the cells that carry oxygen around your body Platelet - cells that help form clots Hemoglobin A1c - a diabetes test that measures the average amount of sugar in your blood over the last 3 months Glucose - blood sugar level Microalbumin urine - measures the amount of protein in your urine BUN and Creatinine - measures of kidney function Sodium, potassium, chloride, CO2, calcium, magnesium, phosphorus - electrolytes Protein (Total), albumin - protein levels Bilirubin (total), Alk. phosphatase, AST, ALT - measures of liver function Vitamin B12, folate - types of vitamins Cholesterol (total), Triglyceride, HDL, LDL - different measures of cholesterol. HDL is the \"good\" cholesterol. LDL is the \"bad\" cholesterol. Triglycerides are a type of fat. T4 (Free) and TSH - tests for thyroid function Hep C Virus Ab - hepatitis C test 
PSA - a prostate cancer screening test 
INR, PT, APTT or PTT - measures of how thin your blood is Iron, ferritin - iron levels in your body Resulted Orders TSH 3RD GENERATION Result Value Ref Range TSH 1.18 0.36 - 3.74 uIU/mL HEMOGLOBIN A1C WITH EAG Result Value Ref Range  Hemoglobin A1c 5.4 4.0 - 5.6 % Est. average glucose 108 mg/dL LIPID PANEL Result Value Ref Range Cholesterol, total 326 (H) <200 MG/DL Triglyceride 203 (H) <150 MG/DL  
 HDL Cholesterol 55 MG/DL  
 LDL, calculated 230.4 (H) 0 - 100 MG/DL VLDL, calculated 40.6 MG/DL  
 CHOL/HDL Ratio 5.9 (H) 0.0 - 5.0 H PYLORI AB, IGM Result Value Ref Range H. pylori Ab, IgM <9.0 0.0 - 8.9 units H PYLORI AB, IGG, QT Result Value Ref Range H. pylori Ab, IgG 0.10 0.00 - 0.79 Index Value H PYLORI AB, IGA Result Value Ref Range H. pylori Ab, IgA <9.0 0.0 - 8.9 units CBC WITH AUTOMATED DIFF Result Value Ref Range WBC 6.3 3.6 - 11.0 K/uL  
 RBC 4.46 3.80 - 5.20 M/uL  
 HGB 12.8 11.5 - 16.0 g/dL HCT 39.4 35.0 - 47.0 % MCV 88.3 80.0 - 99.0 FL  
 MCH 28.7 26.0 - 34.0 PG  
 MCHC 32.5 30.0 - 36.5 g/dL  
 RDW 13.2 11.5 - 14.5 % PLATELET 101 007 - 873 K/uL MPV 11.9 8.9 - 12.9 FL  
 NRBC 0.0 0  WBC ABSOLUTE NRBC 0.00 0.00 - 0.01 K/uL NEUTROPHILS 49 32 - 75 % LYMPHOCYTES 42 12 - 49 % MONOCYTES 7 5 - 13 % EOSINOPHILS 1 0 - 7 % BASOPHILS 1 0 - 1 % IMMATURE GRANULOCYTES 0 0.0 - 0.5 % ABS. NEUTROPHILS 3.1 1.8 - 8.0 K/UL  
 ABS. LYMPHOCYTES 2.6 0.8 - 3.5 K/UL  
 ABS. MONOCYTES 0.4 0.0 - 1.0 K/UL  
 ABS. EOSINOPHILS 0.1 0.0 - 0.4 K/UL  
 ABS. BASOPHILS 0.0 0.0 - 0.1 K/UL  
 ABS. IMM. GRANS. 0.0 0.00 - 0.04 K/UL  
 DF AUTOMATED METABOLIC PANEL, COMPREHENSIVE Result Value Ref Range Sodium 139 136 - 145 mmol/L Potassium 4.1 3.5 - 5.1 mmol/L Chloride 104 97 - 108 mmol/L  
 CO2 27 21 - 32 mmol/L Anion gap 8 5 - 15 mmol/L Glucose 96 65 - 100 mg/dL BUN 10 6 - 20 MG/DL Creatinine 0.67 0.55 - 1.02 MG/DL  
 BUN/Creatinine ratio 15 12 - 20 GFR est AA >60 >60 ml/min/1.73m2 GFR est non-AA >60 >60 ml/min/1.73m2 Calcium 9.6 8.5 - 10.1 MG/DL Bilirubin, total 0.5 0.2 - 1.0 MG/DL  
 ALT (SGPT) 129 (H) 12 - 78 U/L  
 AST (SGOT) 55 (H) 15 - 37 U/L Alk.  phosphatase 87 45 - 117 U/L Protein, total 7.3 6.4 - 8.2 g/dL Albumin 4.2 3.5 - 5.0 g/dL Globulin 3.1 2.0 - 4.0 g/dL A-G Ratio 1.4 1.1 - 2.2 SAMPLES BEING HELD Result Value Ref Range SAMPLES BEING HELD 1sst   
 COMMENT Add-on orders for these samples will be processed based on acceptable specimen integrity and analyte stability, which may vary by analyte. Simple: Patient demonstrates quick and easy understanding

## 2021-07-07 DIAGNOSIS — K21.9 GASTROESOPHAGEAL REFLUX DISEASE WITHOUT ESOPHAGITIS: ICD-10-CM

## 2021-07-07 RX ORDER — FAMOTIDINE 40 MG/1
40 TABLET, FILM COATED ORAL DAILY
Qty: 30 TABLET | Refills: 1 | Status: SHIPPED | OUTPATIENT
Start: 2021-07-07 | End: 2021-10-05

## 2021-07-07 RX ORDER — OMEPRAZOLE 40 MG/1
40 CAPSULE, DELAYED RELEASE ORAL 2 TIMES DAILY
Qty: 60 CAPSULE | Refills: 1 | Status: SHIPPED | OUTPATIENT
Start: 2021-07-07 | End: 2021-07-23 | Stop reason: SDUPTHER

## 2021-07-20 ENCOUNTER — HOSPITAL ENCOUNTER (OUTPATIENT)
Dept: NUTRITION | Age: 26
Discharge: HOME | End: 2021-07-20
Payer: MEDICAID

## 2021-07-20 DIAGNOSIS — E66.9 OBESITY (BMI 30-39.9): ICD-10-CM

## 2021-07-20 PROCEDURE — 97802 MEDICAL NUTRITION INDIV IN: CPT | Performed by: DIETITIAN, REGISTERED

## 2021-07-20 NOTE — PROGRESS NOTES
Isabela Alaniz was informed of the inherent limitations of a virtual visit,  and has consented to a virtual therapy visit on 2021. The patient was informed that at any time during the virtual visit, they can decide to stop the virtual visit. The patient verified that they are physically located in the [facility] for this virtual visit. [id number] [facility]     Nutrition Assessment - Medical Nutrition Therapy   Outpatient Initial Evaluation         Patient Name: Isabela Alaniz : 1995   Treatment Diagnosis: E66.01 (ICD-10-CM) - Morbid (severe) obesity due to excess calories   Referral Source: Deonte Davis NP [facility]): 2021     In time:   1030am             Out time:   11:15am    Total Treatment Time (min):   45 min     Gender: female Age: 32 y.o. Ht: 63 in Wt:  178 lb 80.7 kg   BMI: 31.5 (Obese)  BMR   Male  BMR Female [id number]     Past Medical History:  Patient Active Problem List   Diagnosis Code    Prediabetes R73.03    Hypercholesterolemia E78.00    Shaheed's angina K12.2    MRSA infection A49.02    PCOS (polycystic ovarian syndrome) E28.2    Shingles B02.9    Acquired hypothyroidism E03.9    Endometriosis N80.9    Gastroesophageal reflux disease without esophagitis K21.9    Migraine G43.909    Elevated liver enzymes R74.8    Syncope R55    Fibromyalgia M79.7    Costochondritis M94.0    Chest tightness R07.89    Obesity (BMI 30-39. 9) E66.9    Epigastric pain R10.13    Acute non-recurrent maxillary sinusitis J01.00        Pertinent Medications:     Current Outpatient Medications:     omeprazole (PRILOSEC) 40 mg capsule, TAKE 1 CAP BY MOUTH TWO (2) TIMES A DAY. INDICATIONS: REFLUX, Disp: 60 Capsule, Rfl: 1    famotidine (PEPCID) 40 mg tablet, TAKE 1 TAB BY MOUTH DAILY. INDICATIONS: REFLUX, Disp: 30 Tablet, Rfl: 1    sucralfate (Carafate) 1 gram tablet, Take 1 Tablet by mouth four (4) times daily. , Disp: 120 Tablet, Rfl: 3    aspirin/acetaminophen/caffeine (EXCEDRIN MIGRAINE PO), Take  by mouth., Disp: , Rfl:     ibuprofen 100 mg tablet, Take 100 mg by mouth every six (6) hours as needed for Pain., Disp: , Rfl:     rosuvastatin (CRESTOR) 20 mg tablet, Take 1 Tablet by mouth nightly. Indications: high cholesterol, Disp: 90 Tablet, Rfl: 1    lidocaine (LIDODERM) 5 %, by TransDERmal route every twenty-four (24) hours. Apply patch to the affected area for 12 hours a day and remove for 12 hours a day., Disp: , Rfl:     metFORMIN (GLUCOPHAGE) 500 mg tablet, Take 1 Tab by mouth two (2) times daily (with meals). , Disp: 180 Tab, Rfl: 3    levothyroxine (SYNTHROID) 100 mcg tablet, Take 1 Tab by mouth Daily (before breakfast). , Disp: 90 Tab, Rfl: 3     Biochemical Data:   Lab Results   Component Value Date/Time    Hemoglobin A1c 5.4 05/18/2021 10:40 AM     Lab Results   Component Value Date/Time    Sodium 139 05/18/2021 10:40 AM    Potassium 4.1 05/18/2021 10:40 AM    Chloride 104 05/18/2021 10:40 AM    CO2 27 05/18/2021 10:40 AM    Anion gap 8 05/18/2021 10:40 AM    Glucose 96 05/18/2021 10:40 AM    BUN 10 05/18/2021 10:40 AM    Creatinine 0.67 05/18/2021 10:40 AM    BUN/Creatinine ratio 15 05/18/2021 10:40 AM    GFR est AA >60 05/18/2021 10:40 AM    GFR est non-AA >60 05/18/2021 10:40 AM    Calcium 9.6 05/18/2021 10:40 AM    Bilirubin, total 0.5 05/18/2021 10:40 AM    Alk.  phosphatase 87 05/18/2021 10:40 AM    Protein, total 7.3 05/18/2021 10:40 AM    Albumin 4.2 05/18/2021 10:40 AM    Globulin 3.1 05/18/2021 10:40 AM    A-G Ratio 1.4 05/18/2021 10:40 AM    ALT (SGPT) 129 (H) 05/18/2021 10:40 AM    AST (SGOT) 55 (H) 05/18/2021 10:40 AM     Lab Results   Component Value Date/Time    Cholesterol, total 326 (H) 05/18/2021 10:40 AM    HDL Cholesterol 55 05/18/2021 10:40 AM    LDL, calculated 230.4 (H) 05/18/2021 10:40 AM    VLDL, calculated 40.6 05/18/2021 10:40 AM    Triglyceride 203 (H) 05/18/2021 10:40 AM    CHOL/HDL Ratio 5.9 (H) 05/18/2021 10:40 AM        Assessment:   Pt is a 33 yo female seen for obesity, GERD, hypercholesterolemia, and elevated liver enzymes 2/2 metabolic syndrome. Looking to improve diet to help with improving other comorbidities r/t obesity. Pt reported that she has hypothyroidism, s/p full hysterectomy 2/2 endometriosis, and has a family hx of heart disease and high cholesterol. Pt has had many issues r/t acid reflux recently which have caused her to have pain, nausea, and vomiting with meals. Pt is currently only consuming about 1 meal daily and will frequently miss meals to avoid having side effects of acid reflux. Pt is active at work and has a stationary bike for exercise at home. Acid reflux- GI visit coming up at the end of this week. Food & Nutrition: B- Skip; causes cramping and vomiting   S-   L- At work skips lunch; at home a few chips or apple; Recently not eating much w/ acid reflux   S- apples, fruit, yogurt, sea weed, chips  D- Spaghetti; Fish tacos; will include a vegetable typically; likes salads with tomato   S- Ramen   Drinks: Juice (all day if available), Green Tea ( more for acid reflux), Soda 2x daily, Water (2 glasses)     Do you ever eat when you are not hungry? Boredom: often    Emotional eating /Stress or feeling upset: often, diagnosed anxiety    Do you ever go long periods of time without eating (5-6+ hours) Yes    Exercise: Stationary bike 2 miles 5 days per week. Trying to get back to 5 mile. 150 min of exercise per week. Estimate Needs = Equation( [x] MSJ ; []  HBE; [] Robison; [] other)  * Activity Factor (1.4) -500   Calories: 1600  Protein: 80 Carbs: 200 Fat: 53   Kcal/day  g/day  g/day  g/day       Protein 0.8g/kg = 68 g/day percent: 20  50  30               Nutrition Diagnosis Disordered eating pattern R/T skipping meals AEB pt states skipping breakfast daily; large portions at dinner; late night snacking.      Food and nutrition related knowledge deficit R/T lack of prior education for healthy eating and nutrition for cholesterol and GERD AEB BMI>30, food recall showing high fat and high calorie foods, and request for counseling. Nutrition Intervention &  Education: Educated pt on the pathophysiology of Hyperlipidemia and insulin resistance and the rationale for dietary modifications and increased activity. Educated pt on lean proteins, healthy fats, non-starchy vegetables, and complex carbohydrate food sources. Discussed limiting intake of high fat foods and reviewed foods that typically increase acid reflux. Encouraged pt to avoid sugary beverages by reducing intake of juice and sodas. Educated pt on importance of having small frequent meals to avoid overeating late in the day and to reduce the symptoms of acid reflux. Discussed recommended fluid needs for adequate hydration. Offered suggestions for adding in protein options with the meals and snacks. Handouts Provided: []  Carbohydrates  []  Protein  []  Non-starchy Vegatbles  []  Food Label  []  Meal and Snack Ideas  []  Food Journals []  Diabetes  []  Cholesterol  []  Sodium  []  Gen Nutr Guidelines  []  SBGM Guidelines  []  Others:   Information Reviewed with: Pt    Readiness to Change Stage: []  Pre-contemplative    []  Contemplative  [x]  Preparation               []  Action                  []  Maintenance   Potential Barriers to Learning:                      []  Decline in memory    []  Language barrier   []  Other:  []  Emotional                  []  Limited mobility     [x]  None  []  Lack of motivation     [] Vision, hearing or cognitive impairment   Expected Compliance: Pt expressed comprehension, high motivation, and compliance is expected.         Nutritional Goal - To promote lifestyle changes to result in:    [x]  Weight loss  []  Improved diabetic control  []  Decreased cholesterol levels  []  Decreased blood pressure  []  Weight maintenance []  Preventing any interactions associated with food allergies  []  Adequate weight gain toward goal weight  []  Other:        Patient Goals:   - Avoid skipping meals by having a small snack/meal at least 3 times daily.     - Improve physical activity w/goal to use stationary bike for 30 minutes 5 times per week. - Reduce empty calorie intake by limiting sugary beverage to one 8-oz serving every day and increase water intake to 6-8 glasses daily. Dietitian Signature: Milton Nichole RDN Date: 7/20/2021   Follow-up: 2 weeks Time: 10:28 AM   Osbaldo Agustin is a 32 y.o. female being evaluated by a Virtual Visit (video visit) encounter to address concerns as mentioned above. A caregiver was present when appropriate. Due to this being a TeleHealth encounter (During ILJTP-08 public health emergency), evaluation of the following areas was limited: Nutrition Focused Physical Exam. Pursuant to the emergency declaration under the [address] [place] [address] authority and the [employer] Act, this Virtual Visit was conducted with patient's (and/or legal guardian's) consent, to reduce the risk of exposure to COVID-19 and provide necessary medical care. Services were provided through a video synchronous discussion virtually to substitute for in-person encounter. --Milton Nichole RD on 7/20/2021 at 10:28 AM    An electronic signature was used to authenticate this note.

## 2021-07-23 DIAGNOSIS — K21.9 GASTROESOPHAGEAL REFLUX DISEASE WITHOUT ESOPHAGITIS: ICD-10-CM

## 2021-07-23 RX ORDER — OMEPRAZOLE 40 MG/1
40 CAPSULE, DELAYED RELEASE ORAL 2 TIMES DAILY
Qty: 180 CAPSULE | Refills: 1 | Status: SHIPPED | OUTPATIENT
Start: 2021-07-23 | End: 2021-10-05

## 2021-09-10 ENCOUNTER — TELEPHONE (OUTPATIENT)
Dept: INTERNAL MEDICINE CLINIC | Age: 26
End: 2021-09-10

## 2021-09-10 NOTE — TELEPHONE ENCOUNTER
Patient called in as they believe they have covid. Patient would like a call back from the nurse to discuss the next steps and whether or not she should quarantine this weekend.

## 2021-09-10 NOTE — TELEPHONE ENCOUNTER
Return call made to patient. Patient is have covid like sx high fever 100.0 n/v patient also stated that she is having some blurred vision has been going on since yesterday. Patient was advised to go to urgent care for covid testing patient stated that she has no way of getting to any urgent care until Monday. Patient was offered [facility] to come to home but patient live out of the area that dispatch health goes to. Patient family is on vacation and she has no one else to take her to urgent care. I advised patient to keep us updated and if sx get worse, have SOB, chest pain to call 911. Patient was thankful.

## 2021-10-05 ENCOUNTER — OFFICE VISIT (OUTPATIENT)
Dept: INTERNAL MEDICINE CLINIC | Age: 26
End: 2021-10-05
Payer: MEDICAID

## 2021-10-05 VITALS
TEMPERATURE: 98 F | BODY MASS INDEX: 29.95 KG/M2 | WEIGHT: 169 LBS | DIASTOLIC BLOOD PRESSURE: 76 MMHG | HEIGHT: 63 IN | SYSTOLIC BLOOD PRESSURE: 113 MMHG | OXYGEN SATURATION: 97 % | HEART RATE: 68 BPM | RESPIRATION RATE: 14 BRPM

## 2021-10-05 DIAGNOSIS — M79.652 LEFT THIGH PAIN: ICD-10-CM

## 2021-10-05 DIAGNOSIS — E78.00 HYPERCHOLESTEROLEMIA: ICD-10-CM

## 2021-10-05 DIAGNOSIS — N10 ACUTE PYELONEPHRITIS: Primary | ICD-10-CM

## 2021-10-05 DIAGNOSIS — K21.9 GASTROESOPHAGEAL REFLUX DISEASE WITHOUT ESOPHAGITIS: ICD-10-CM

## 2021-10-05 DIAGNOSIS — E03.9 ACQUIRED HYPOTHYROIDISM: ICD-10-CM

## 2021-10-05 PROBLEM — J01.00 ACUTE NON-RECURRENT MAXILLARY SINUSITIS: Status: RESOLVED | Noted: 2021-06-23 | Resolved: 2021-10-05

## 2021-10-05 LAB
BILIRUB UR QL STRIP: NEGATIVE
GLUCOSE UR-MCNC: NEGATIVE MG/DL
KETONES P FAST UR STRIP-MCNC: NORMAL MG/DL
PH UR STRIP: 7.5 [PH] (ref 4.6–8)
PROT UR QL STRIP: NEGATIVE
SP GR UR STRIP: 1.02 (ref 1–1.03)
UA UROBILINOGEN AMB POC: NORMAL (ref 0.2–1)
URINALYSIS CLARITY POC: CLEAR
URINALYSIS COLOR POC: NORMAL
URINE BLOOD POC: NORMAL
URINE LEUKOCYTES POC: NORMAL
URINE NITRITES POC: NEGATIVE

## 2021-10-05 PROCEDURE — 81003 URINALYSIS AUTO W/O SCOPE: CPT | Performed by: INTERNAL MEDICINE

## 2021-10-05 PROCEDURE — 99215 OFFICE O/P EST HI 40 MIN: CPT | Performed by: INTERNAL MEDICINE

## 2021-10-05 RX ORDER — ROSUVASTATIN CALCIUM 20 MG/1
20 TABLET, COATED ORAL
Qty: 90 TABLET | Refills: 1 | Status: SHIPPED | OUTPATIENT
Start: 2021-10-05

## 2021-10-05 RX ORDER — LEVOTHYROXINE SODIUM 100 UG/1
100 TABLET ORAL
Qty: 90 TABLET | Refills: 3 | Status: SHIPPED | OUTPATIENT
Start: 2021-10-05

## 2021-10-05 RX ORDER — PREGABALIN 150 MG/1
150 CAPSULE ORAL 2 TIMES DAILY
Qty: 60 CAPSULE | Refills: 0 | Status: SHIPPED | OUTPATIENT
Start: 2021-10-05

## 2021-10-05 RX ORDER — CEFDINIR 300 MG/1
300 CAPSULE ORAL 2 TIMES DAILY
Qty: 20 CAPSULE | Refills: 0 | Status: SHIPPED | OUTPATIENT
Start: 2021-10-05 | End: 2021-10-15

## 2021-10-05 NOTE — ASSESSMENT & PLAN NOTE
Was advised to start rosuvastatin last visit but pt reports insurance denied coverage because she doesn't have a history of high cholesterol.   Last   Prescription resent, will try to follow up with any issues getting medication

## 2021-10-05 NOTE — ASSESSMENT & PLAN NOTE
Reports about a 1 week history of posterior thigh pain, worse with knee extension and feels like a \"stretching\" sensation. Worse after sitting for a long period of time and needing to get up. Using heat/cold packs with minimal improvement. Lidocaine patches also not helpful. Had some old lyrica which she has been taking which does help. Possible muscle strain. Given GERD and gastritis on EGD, recommend avoiding NSAIDs. Unable to do high dose tylenol due to liver issues.   Since lyrica seems to be helping, will provide short course

## 2021-10-05 NOTE — PROGRESS NOTES
Note   Chief Complaint   Possible UTI    Ranjan Vazquez is a 32 y.o. female     1. Acute pyelonephritis  Assessment & Plan:  Reports a 1 week history of dysuria, fever up to 102, nausea, vomiting, and back pain. No chills. Negative COVID test.  No vaginal discharge. UA with small leuks. Recommend treating for possible pyelo with cefdinir (allergic to sulfa and cipro). Advised to call if symptoms do not improve  Orders:  -     AMB POC URINALYSIS DIP STICK AUTO W/O MICRO  -     cefdinir (OMNICEF) 300 mg capsule; Take 1 Capsule by mouth two (2) times a day for 10 days. , Normal, Disp-20 Capsule, R-0  -     CULTURE, URINE; Future  2. Hypercholesterolemia  Assessment & Plan:  Was advised to start rosuvastatin last visit but pt reports insurance denied coverage because she doesn't have a history of high cholesterol. Last   Prescription resent, will try to follow up with any issues getting medication  Orders:  -     rosuvastatin (CRESTOR) 20 mg tablet; Take 1 Tablet by mouth nightly. Indications: high cholesterol, Normal, Disp-90 Tablet, R-1  3. Left thigh pain  Assessment & Plan:  Reports about a 1 week history of posterior thigh pain, worse with knee extension and feels like a \"stretching\" sensation. Worse after sitting for a long period of time and needing to get up. Using heat/cold packs with minimal improvement. Lidocaine patches also not helpful. Had some old lyrica which she has been taking which does help. Possible muscle strain. Given GERD and gastritis on EGD, recommend avoiding NSAIDs. Unable to do high dose tylenol due to liver issues. Since lyrica seems to be helping, will provide short course  Orders:  -     pregabalin (LYRICA) 150 mg capsule; Take 1 Capsule by mouth two (2) times a day. Max Daily Amount: 300 mg., Normal, Disp-60 Capsule, R-0  4. Acquired hypothyroidism  -     levothyroxine (SYNTHROID) 100 mcg tablet; Take 1 Tablet by mouth Daily (before breakfast). , Normal, Disp-90 Tablet, R-3  5. Gastroesophageal reflux disease without esophagitis  Assessment & Plan:  Since last visit, underwent an EGD in august showing:    Findings:  A patulous lower esophageal sphincter was found. Gastroesophageal reflux is evident by free flow of gastric contents at the gastroesophageal junction. There is no endoscopic evidence of stricture in the entire esophagus. Biopsies were taken with a cold forceps in the entire esophagus for histology. Bilious fluid was found in the stomach. Patchy mildly erythematous mucosa without bleeding was found in the stomach. Biopsies were taken with a cold forceps for histology. The examined duodenum was normal.Post-Operative DIagnosis:  - Patulous lower esophageal sphincter. - GERD, as evident by free flow of gastric contents into the esophagus.  - Bilious gastric fluid. - Erythematous mucosa in the stomach. Biopsied.  - Normal examined duodenum.  - Biopsies were taken with a cold forceps for histology in the entire esophagus. Currently on a reflux medicine given by GI, although she can't remember which one. She also had an US which showed an echogenic liver, likely fatty liver. She was referred to [facility], although not sure for what or to whom. Appt in Nov.    Follow up with GI Dr. Conchita Drake, risks, possible drug interactions, and side effects of all new medications were reviewed with the patient. Pt verbalized understanding. Return to clinic:  As needed    An electronic signature was used to authenticate this note. Reese Welsh MD  [facility]  10/5/2021    No future appointments. On this date 10/05/2021 I have spent 52 minutes reviewing previous notes, test results and face to face with the patient discussing the diagnosis and importance of compliance with the treatment plan as well as documenting on the day of the visit.   Objective   Vitals:       Visit Vitals  /76 (BP 1 Location: Left upper arm, BP Patient Position: Sitting, BP Cuff Size: Adult)   Pulse 68   Temp 98 °F (36.7 °C) (Temporal)   Resp 14   Ht 5' 3\" (1.6 m)   Wt 169 lb (76.7 kg)   SpO2 97%   BMI 29.94 kg/m²        Physical Exam  Constitutional:       Appearance: Normal appearance. She is not ill-appearing. Cardiovascular:      Rate and Rhythm: Normal rate and regular rhythm. Heart sounds: No murmur heard. No friction rub. No gallop. Pulmonary:      Effort: No respiratory distress. Breath sounds: Normal breath sounds. No wheezing, rhonchi or rales. Abdominal:      General: Bowel sounds are normal. There is no distension. Palpations: Abdomen is soft. There is no mass. Tenderness: There is no abdominal tenderness. There is no right CVA tenderness, left CVA tenderness or guarding. Musculoskeletal:      Comments: Normal ROM of left hip; left - pain in posterior thigh with knee extension and hip flexion/internal and external rotation. No tenderness to palpation   Neurological:      Mental Status: She is alert. Current Outpatient Medications   Medication Sig    rosuvastatin (CRESTOR) 20 mg tablet Take 1 Tablet by mouth nightly. Indications: high cholesterol    cefdinir (OMNICEF) 300 mg capsule Take 1 Capsule by mouth two (2) times a day for 10 days.  pregabalin (LYRICA) 150 mg capsule Take 1 Capsule by mouth two (2) times a day. Max Daily Amount: 300 mg.  levothyroxine (SYNTHROID) 100 mcg tablet Take 1 Tablet by mouth Daily (before breakfast).  aspirin/acetaminophen/caffeine (EXCEDRIN MIGRAINE PO) Take  by mouth.  ibuprofen 100 mg tablet Take 100 mg by mouth every six (6) hours as needed for Pain.  lidocaine (LIDODERM) 5 % by TransDERmal route every twenty-four (24) hours. Apply patch to the affected area for 12 hours a day and remove for 12 hours a day.  metFORMIN (GLUCOPHAGE) 500 mg tablet Take 1 Tab by mouth two (2) times daily (with meals).      No current facility-administered medications for this visit.

## 2021-10-05 NOTE — ASSESSMENT & PLAN NOTE
Since last visit, underwent an EGD in august showing:    Findings:  A patulous lower esophageal sphincter was found. Gastroesophageal reflux is evident by free flow of gastric contents at the gastroesophageal junction. There is no endoscopic evidence of stricture in the entire esophagus. Biopsies were taken with a cold forceps in the entire esophagus for histology. Bilious fluid was found in the stomach. Patchy mildly erythematous mucosa without bleeding was found in the stomach. Biopsies were taken with a cold forceps for histology. The examined duodenum was normal.Post-Operative DIagnosis:  - Patulous lower esophageal sphincter. - GERD, as evident by free flow of gastric contents into the esophagus.  - Bilious gastric fluid. - Erythematous mucosa in the stomach. Biopsied.  - Normal examined duodenum.  - Biopsies were taken with a cold forceps for histology in the entire esophagus. Currently on a reflux medicine given by GI, although she can't remember which one. She also had an US which showed an echogenic liver, likely fatty liver. She was referred to [facility], although not sure for what or to whom.   Appt in Nov.    Follow up with GI Dr. Kathleen Avery

## 2021-10-05 NOTE — ASSESSMENT & PLAN NOTE
Reports a 1 week history of dysuria, fever up to 102, nausea, vomiting, and back pain. No chills. Negative COVID test.  No vaginal discharge. UA with small leuks. Recommend treating for possible pyelo with cefdinir (allergic to sulfa and cipro).   Advised to call if symptoms do not improve

## 2021-10-08 LAB
BACTERIA SPEC CULT: ABNORMAL
CC UR VC: ABNORMAL
SERVICE CMNT-IMP: ABNORMAL

## 2021-11-03 ENCOUNTER — OFFICE VISIT (OUTPATIENT)
Dept: INTERNAL MEDICINE CLINIC | Age: 26
End: 2021-11-03
Payer: MEDICAID

## 2021-11-03 VITALS
BODY MASS INDEX: 31.01 KG/M2 | SYSTOLIC BLOOD PRESSURE: 102 MMHG | HEART RATE: 84 BPM | DIASTOLIC BLOOD PRESSURE: 74 MMHG | HEIGHT: 63 IN | OXYGEN SATURATION: 95 % | WEIGHT: 175 LBS | RESPIRATION RATE: 14 BRPM | TEMPERATURE: 98.2 F

## 2021-11-03 DIAGNOSIS — N80.9 ENDOMETRIOSIS: ICD-10-CM

## 2021-11-03 DIAGNOSIS — M20.11 VALGUS DEFORMITY OF BOTH GREAT TOES: ICD-10-CM

## 2021-11-03 DIAGNOSIS — Z23 NEEDS FLU SHOT: ICD-10-CM

## 2021-11-03 DIAGNOSIS — R25.2 MUSCLE CRAMPING: Primary | ICD-10-CM

## 2021-11-03 DIAGNOSIS — E78.00 HYPERCHOLESTEROLEMIA: ICD-10-CM

## 2021-11-03 DIAGNOSIS — M20.12 VALGUS DEFORMITY OF BOTH GREAT TOES: ICD-10-CM

## 2021-11-03 LAB
ANION GAP SERPL CALC-SCNC: 4 MMOL/L (ref 5–15)
BUN SERPL-MCNC: 11 MG/DL (ref 6–20)
BUN/CREAT SERPL: 14 (ref 12–20)
CALCIUM SERPL-MCNC: 10.2 MG/DL (ref 8.5–10.1)
CHLORIDE SERPL-SCNC: 104 MMOL/L (ref 97–108)
CO2 SERPL-SCNC: 31 MMOL/L (ref 21–32)
CREAT SERPL-MCNC: 0.77 MG/DL (ref 0.55–1.02)
GLUCOSE SERPL-MCNC: 98 MG/DL (ref 65–100)
MAGNESIUM SERPL-MCNC: 2.2 MG/DL (ref 1.6–2.4)
POTASSIUM SERPL-SCNC: 4.3 MMOL/L (ref 3.5–5.1)
SODIUM SERPL-SCNC: 139 MMOL/L (ref 136–145)

## 2021-11-03 PROCEDURE — 90686 IIV4 VACC NO PRSV 0.5 ML IM: CPT | Performed by: INTERNAL MEDICINE

## 2021-11-03 PROCEDURE — 99214 OFFICE O/P EST MOD 30 MIN: CPT | Performed by: INTERNAL MEDICINE

## 2021-11-03 PROCEDURE — 90471 IMMUNIZATION ADMIN: CPT | Performed by: INTERNAL MEDICINE

## 2021-11-03 RX ORDER — CYCLOBENZAPRINE HCL 5 MG
5 TABLET ORAL
Qty: 60 TABLET | Refills: 0 | Status: SHIPPED | OUTPATIENT
Start: 2021-11-03

## 2021-11-03 RX ORDER — PREDNISONE 5 MG/1
TABLET ORAL
Qty: 21 TABLET | Refills: 0 | Status: SHIPPED | OUTPATIENT
Start: 2021-11-03 | End: 2021-11-23 | Stop reason: ALTCHOICE

## 2021-11-03 NOTE — PROGRESS NOTES
Patient present for routine immunizations. Pt denies any symptoms , reactions or allergies that would exclude them from being immunized today. Risks and adverse reactions were discussed and the VIS was given to them. All questions were addressed. Pt was observed for 10 min post injection. There were no reactions observed.      Hailee Yoon 89

## 2021-11-03 NOTE — ASSESSMENT & PLAN NOTE
Reports since her last visit, she has had persistent thigh pain that is described as visible cramping. Has difficult time walking on her leg because of the pain. Worse with coughing or sneezing. Lyrica not helping much. No fever or chills. No swelling. Unclear source of painback versus muscle. Recommend prednisone taper and muscle relaxer.

## 2021-11-03 NOTE — ASSESSMENT & PLAN NOTE
Requesting referral to OB/GYN to discuss hormone therapy.   She is status post hysterectomy  Referral provided as requested

## 2021-11-03 NOTE — PROGRESS NOTES
Note   Chief Complaint   Muscle cramping    Kristen Batista is a 32 y.o. female     1. Muscle cramping  Assessment & Plan:  Reports since her last visit, she has had persistent thigh pain that is described as visible cramping. Has difficult time walking on her leg because of the pain. Worse with coughing or sneezing. Lyrica not helping much. No fever or chills. No swelling. Unclear source of painback versus muscle. Recommend prednisone taper and muscle relaxer. Orders:  -     cyclobenzaprine (FLEXERIL) 5 mg tablet; Take 1 Tablet by mouth three (3) times daily as needed for Muscle Spasm(s). , Normal, Disp-60 Tablet, R-0  -     predniSONE (STERAPRED) 5 mg dose pack; See administration instruction per 5mg dose pack, Normal, Disp-21 Tablet, R-0  -     METABOLIC PANEL, BASIC; Future  -     MAGNESIUM; Future  2. Endometriosis  Assessment & Plan:  Requesting referral to OB/GYN to discuss hormone therapy. She is status post hysterectomy  Referral provided as requested  Orders:  -     REFERRAL TO OBSTETRICS AND GYNECOLOGY  3. Needs flu shot  -     INFLUENZA VIRUS VAC QUAD,SPLIT,PRESV FREE SYRINGE IM  4. Hypercholesterolemia  Assessment & Plan:  Was able to start rosuvastatin after last visit. Continue, check labs another visit  5. Valgus deformity of both great toes  Assessment & Plan:  Reports noting that her toes are pointing outward. Reports mild pain  Likely valgus deformity. Recommend wide toe shoes       Benefits, risks, possible drug interactions, and side effects of all new medications were reviewed with the patient. Pt verbalized understanding. Return to clinic:  As needed    An electronic signature was used to authenticate this note. Robert Avila MD  [facility]  11/3/2021    No future appointments.      Objective   Vitals:       Visit Vitals  /74 (BP 1 Location: Left upper arm, BP Patient Position: Sitting, BP Cuff Size: Adult)   Pulse 84   Temp 98.2 °F (36.8 °C) (Temporal)   Resp 14   Ht 5' 3\" (1.6 m)   Wt 175 lb (79.4 kg)   SpO2 95%   BMI 31.00 kg/m²        Physical Exam  Constitutional:       Appearance: Normal appearance. She is not ill-appearing. Cardiovascular:      Rate and Rhythm: Normal rate. Pulmonary:      Effort: No respiratory distress. Musculoskeletal:      Comments: Tenderness to posterior left thigh. No swelling or masses noted. 2+ pedal pulses BL. Neurological:      Mental Status: She is alert. Current Outpatient Medications   Medication Sig    cyclobenzaprine (FLEXERIL) 5 mg tablet Take 1 Tablet by mouth three (3) times daily as needed for Muscle Spasm(s).  predniSONE (STERAPRED) 5 mg dose pack See administration instruction per 5mg dose pack    rosuvastatin (CRESTOR) 20 mg tablet Take 1 Tablet by mouth nightly. Indications: high cholesterol    pregabalin (LYRICA) 150 mg capsule Take 1 Capsule by mouth two (2) times a day. Max Daily Amount: 300 mg.  levothyroxine (SYNTHROID) 100 mcg tablet Take 1 Tablet by mouth Daily (before breakfast).  aspirin/acetaminophen/caffeine (EXCEDRIN MIGRAINE PO) Take  by mouth.  ibuprofen 100 mg tablet Take 100 mg by mouth every six (6) hours as needed for Pain.  lidocaine (LIDODERM) 5 % by TransDERmal route every twenty-four (24) hours. Apply patch to the affected area for 12 hours a day and remove for 12 hours a day.  metFORMIN (GLUCOPHAGE) 500 mg tablet Take 1 Tab by mouth two (2) times daily (with meals). No current facility-administered medications for this visit.

## 2021-11-03 NOTE — ASSESSMENT & PLAN NOTE
Reports noting that her toes are pointing outward. Reports mild pain  Likely valgus deformity.     Recommend wide toe shoes

## 2021-11-03 NOTE — PATIENT INSTRUCTIONS
Vaccine Information Statement Influenza (Flu) Vaccine (Inactivated or Recombinant): What You Need to Know Many vaccine information statements are available in [place] and other languages. See www.immunize.org/vis. Hojas de información sobre vacunas están disponibles en español y en muchos otros idiomas. Visite www.immunize.org/vis. 1. Why get vaccinated? Influenza vaccine can prevent influenza (flu). Flu is a contagious disease that spreads around the [facility] every year, usually between October and May. Anyone can get the flu, but it is more dangerous for some people. Infants and young children, people 72 years and older, pregnant people, and people with certain health conditions or a weakened immune system are at greatest risk of flu complications. Pneumonia, bronchitis, sinus infections, and ear infections are examples of flu-related complications. If you have a medical condition, such as heart disease, cancer, or diabetes, flu can make it worse. Flu can cause fever and chills, sore throat, muscle aches, fatigue, cough, headache, and runny or stuffy nose. Some people may have vomiting and diarrhea, though this is more common in children than adults. In an average year, thousands of people in the [facility] die from flu, and many more are hospitalized. Flu vaccine prevents millions of illnesses and flu-related visits to the doctor each year. 2. Influenza vaccines CDC recommends everyone 6 months and older get vaccinated every flu season. Children 6 months through 6years of age may need 2 doses during a single flu season. Everyone else needs only 1 dose each flu season. It takes about 2 weeks for protection to develop after vaccination. There are many flu viruses, and they are always changing. Each year a new flu vaccine is made to protect against the influenza viruses believed to be likely to cause disease in the upcoming flu season.  Even when the vaccine doesnt exactly match these viruses, it may still provide some protection. Influenza vaccine does not cause flu. Influenza vaccine may be given at the same time as other vaccines. 3. Talk with your health care provider Tell your vaccination provider if the person getting the vaccine: 
 Has had an allergic reaction after a previous dose of influenza vaccine, or has any severe, life-threatening allergies  Has ever had Guillain-Barré Syndrome (also called GBS) In some cases, your health care provider may decide to postpone influenza vaccination until a future visit. Influenza vaccine can be administered at any time during pregnancy. People who are or will be pregnant during influenza season should receive inactivated influenza vaccine. People with minor illnesses, such as a cold, may be vaccinated. People who are moderately or severely ill should usually wait until they recover before getting influenza vaccine. Your health care provider can give you more information. 4. Risks of a vaccine reaction  Soreness, redness, and swelling where the shot is given, fever, muscle aches, and headache can happen after influenza vaccination.  There may be a very small increased risk of Guillain-Barré Syndrome (GBS) after inactivated influenza vaccine (the flu shot). Liv Coburn children who get the flu shot along with pneumococcal vaccine (PCV13) and/or DTaP vaccine at the same time might be slightly more likely to have a seizure caused by fever. Tell your health care provider if a child who is getting flu vaccine has ever had a seizure. People sometimes faint after medical procedures, including vaccination. Tell your provider if you feel dizzy or have vision changes or ringing in the ears. As with any medicine, there is a very remote chance of a vaccine causing a severe allergic reaction, other serious injury, or death. 5. What if there is a serious problem?  
 
An allergic reaction could occur after the vaccinated person leaves the clinic. If you see signs of a severe allergic reaction (hives, swelling of the face and throat, difficulty breathing, a fast heartbeat, dizziness, or weakness), call 9-1-1 and get the person to the nearest hospital. 
 
For other signs that concern you, call your health care provider. Adverse reactions should be reported to the Vaccine Adverse Event Reporting System (VAERS). Your health care provider will usually file this report, or you can do it yourself. Visit the VAERS website at www.vaers. [facility].gov or call 1-670.380.2952. VAERS is only for reporting reactions, and VAERS staff members do not give medical advice. 6. The National Vaccine Injury Compensation Program 
 
The [facility] Vaccine Injury Compensation Program (VICP) is a federal program that was created to compensate people who may have been injured by certain vaccines. Claims regarding alleged injury or death due to vaccination have a time limit for filing, which may be as short as two years. Visit the VICP website at www.[facility].gov/vaccinecompensation or call 2-913.588.4670 to learn about the program and about filing a claim. 7. How can I learn more?  Ask your health care provider.  Call your local or state health department.  Visit the website of the Food and Drug Administration (FDA) for vaccine package inserts and additional information at www.fda.gov/vaccines-blood-biologics/vaccines.  Contact the Centers for Disease Control and Prevention (CDC): 
- Call 1-627.150.7599 (9-371-OTH-INFO) or 
- Visit CDCs influenza website at www.cdc.gov/flu. Vaccine Information Statement Inactivated Influenza Vaccine 8/6/2021 
42 JONATHAN Birdmaggie Lisette [id number] Department of [facility] and [employer] Centers for Disease Control and Prevention Office Use Only

## 2021-11-04 ENCOUNTER — TELEPHONE (OUTPATIENT)
Dept: INTERNAL MEDICINE CLINIC | Age: 26
End: 2021-11-04

## 2021-11-04 NOTE — TELEPHONE ENCOUNTER
Return call made to patient to advise of providers message and recommendations. Patient was thankful.

## 2021-11-04 NOTE — PROGRESS NOTES
Please call the pt and let her know that her labs are normal except for slightly high calcium. I don't think that's causing her symptoms, but I recommend increasing her water intake as this can be a sign of dehydration. Her potassium levels are normal.  I recommend using the medications we discussed during her visit and letting me know if symptoms still persist.    =  Magnesium normal.  Potassium normal.  Calcium 10. 2.

## 2021-11-04 NOTE — TELEPHONE ENCOUNTER
----- Message from Steve Yun MD sent at 68/8/2841  8:40 AM EDT -----  Please call the pt and let her know that her labs are normal except for slightly high calcium. I don't think that's causing her symptoms, but I recommend increasing her water intake as this can be a sign of dehydration. Her potassium levels are normal.  I recommend using the medications we discussed during her visit and letting me know if symptoms still persist.    =  Magnesium normal.  Potassium normal.  Calcium 10.2.

## 2021-11-23 ENCOUNTER — OFFICE VISIT (OUTPATIENT)
Dept: INTERNAL MEDICINE CLINIC | Age: 26
End: 2021-11-23
Payer: MEDICAID

## 2021-11-23 VITALS
WEIGHT: 177.8 LBS | DIASTOLIC BLOOD PRESSURE: 83 MMHG | OXYGEN SATURATION: 98 % | HEIGHT: 63 IN | BODY MASS INDEX: 31.5 KG/M2 | SYSTOLIC BLOOD PRESSURE: 115 MMHG | TEMPERATURE: 98.3 F | HEART RATE: 80 BPM | RESPIRATION RATE: 16 BRPM

## 2021-11-23 DIAGNOSIS — M54.10 RADICULAR SYNDROME OF LEFT LEG: ICD-10-CM

## 2021-11-23 DIAGNOSIS — R23.8 VESICULAR ERUPTION: Primary | ICD-10-CM

## 2021-11-23 DIAGNOSIS — M79.652 LEFT THIGH PAIN: ICD-10-CM

## 2021-11-23 PROCEDURE — 99214 OFFICE O/P EST MOD 30 MIN: CPT | Performed by: NURSE PRACTITIONER

## 2021-11-23 RX ORDER — VALACYCLOVIR HYDROCHLORIDE 1 G/1
1000 TABLET, FILM COATED ORAL 3 TIMES DAILY
Qty: 21 TABLET | Refills: 0 | Status: SHIPPED | OUTPATIENT
Start: 2021-11-23

## 2021-11-23 NOTE — LETTER
NOTIFICATION RETURN TO WORK / SCHOOL    11/23/2021 1:49 PM    Ms. Rena Sharp  [place] [address]      To Whom It May Concern:    Rena Sharp is currently under the care of [address] [place] [address],[address]. She was out of work 11/22 and 11/23 due to medical condition. She will return to work/school on: 11/24/2021. If there are questions or concerns please have the patient contact our office.         Sincerely,      Osmin Carrillo NP

## 2021-11-23 NOTE — PATIENT INSTRUCTIONS
Low Back Pain: Exercises  Introduction  Here are some examples of exercises for you to try. The exercises may be suggested for a condition or for rehabilitation. Start each exercise slowly. Ease off the exercises if you start to have pain. You will be told when to start these exercises and which ones will work best for you. How to do the exercises  Press-up    1. Lie on your stomach, supporting your body with your forearms. 2. Press your elbows down into the floor to raise your upper back. As you do this, relax your stomach muscles and allow your back to arch without using your back muscles. As your press up, do not let your hips or pelvis come off the floor. 3. Hold for 15 to 30 seconds, then relax. 4. Repeat 2 to 4 times. Alternate arm and leg (bird dog) exercise    Do this exercise slowly. Try to keep your body straight at all times, and do not let one hip drop lower than the other. 1. Start on the floor, on your hands and knees. 2. Tighten your belly muscles. 3. Raise one leg off the floor, and hold it straight out behind you. Be careful not to let your hip drop down, because that will twist your trunk. 4. Hold for about 6 seconds, then lower your leg and switch to the other leg. 5. Repeat 8 to 12 times on each leg. 6. Over time, work up to holding for 10 to 30 seconds each time. 7. If you feel stable and secure with your leg raised, try raising the opposite arm straight out in front of you at the same time. Knee-to-chest exercise    1. Lie on your back with your knees bent and your feet flat on the floor. 2. Bring one knee to your chest, keeping the other foot flat on the floor (or keeping the other leg straight, whichever feels better on your lower back). 3. Keep your lower back pressed to the floor. Hold for at least 15 to 30 seconds. 4. Relax, and lower the knee to the starting position. 5. Repeat with the other leg. Repeat 2 to 4 times with each leg.   6. To get more stretch, put your other leg flat on the floor while pulling your knee to your chest.  Curl-ups    1. Lie on the floor on your back with your knees bent at a 90-degree angle. Your feet should be flat on the floor, about 12 inches from your buttocks. 2. Cross your arms over your chest. If this bothers your neck, try putting your hands behind your neck (not your head), with your elbows spread apart. 3. Slowly tighten your belly muscles and raise your shoulder blades off the floor. 4. Keep your head in line with your body, and do not press your chin to your chest.  5. Hold this position for 1 or 2 seconds, then slowly lower yourself back down to the floor. 6. Repeat 8 to 12 times. Pelvic tilt exercise    1. Lie on your back with your knees bent. 2. \"Brace\" your stomach. This means to tighten your muscles by pulling in and imagining your belly button moving toward your spine. You should feel like your back is pressing to the floor and your hips and pelvis are rocking back. 3. Hold for about 6 seconds while you breathe smoothly. 4. Repeat 8 to 12 times. Heel dig bridging    1. Lie on your back with both knees bent and your ankles bent so that only your heels are digging into the floor. Your knees should be bent about 90 degrees. 2. Then push your heels into the floor, squeeze your buttocks, and lift your hips off the floor until your shoulders, hips, and knees are all in a straight line. 3. Hold for about 6 seconds as you continue to breathe normally, and then slowly lower your hips back down to the floor and rest for up to 10 seconds. 4. Do 8 to 12 repetitions. Hamstring stretch in doorway    1. Lie on your back in a doorway, with one leg through the open door. 2. Slide your leg up the wall to straighten your knee. You should feel a gentle stretch down the back of your leg. 3. Hold the stretch for at least 15 to 30 seconds. Do not arch your back, point your toes, or bend either knee.  Keep one heel touching the floor and the other heel touching the wall. 4. Repeat with your other leg. 5. Do 2 to 4 times for each leg. Hip flexor stretch    1. Kneel on the floor with one knee bent and one leg behind you. Place your forward knee over your foot. Keep your other knee touching the floor. 2. Slowly push your hips forward until you feel a stretch in the upper thigh of your rear leg. 3. Hold the stretch for at least 15 to 30 seconds. Repeat with your other leg. 4. Do 2 to 4 times on each side. Wall sit    1. Stand with your back 10 to 12 inches away from a wall. 2. Lean into the wall until your back is flat against it. 3. Slowly slide down until your knees are slightly bent, pressing your lower back into the wall. 4. Hold for about 6 seconds, then slide back up the wall. 5. Repeat 8 to 12 times. Follow-up care is a key part of your treatment and safety. Be sure to make and go to all appointments, and call your doctor if you are having problems. It's also a good idea to know your test results and keep a list of the medicines you take. Where can you learn more? Go to http://www.gray.com/  Enter Q750 in the search box to learn more about \"Low Back Pain: Exercises. \"  Current as of: July 1, 2021               Content Version: 13.0  © 2006-2021 [employer]. Care instructions adapted under license by [employer] (which disclaims liability or warranty for this information). If you have questions about a medical condition or this instruction, always ask your healthcare professional. [place] [address] any warranty or liability for your use of this information. Cold Sores: Care Instructions  Your Care Instructions  Cold sores are clusters of small blisters on the lip and skin around or inside the mouth. Often the first sign of a cold sore is a spot that tingles, burns, or itches. A blister usually forms within 24 hours.  The skin around the blisters can be red and inflamed. The blisters can break open, weep a clear fluid, and then scab over after a few days. Cold sores most often heal in 7 to 10 days without a scar. They are sometimes called fever blisters. Cold sores are caused by a virus called the herpes simplex virus. This virus also causes some cases of genital herpes. The virus can spread from a sore in the genital area to the lips. Or it can spread from a cold sore on the lips to the genital area. Cold sores most often go away on their own. But if they are severe, embarrass you, or cause pain, your doctor may prescribe antiviral medicine to relieve pain and help prevent outbreaks. Follow-up care is a key part of your treatment and safety. Be sure to make and go to all appointments, and call your doctor if you are having problems. It's also a good idea to know your test results and keep a list of the medicines you take. How can you care for yourself at home? · Wash your hands often. And try not to touch your cold sores. This will help to avoid spreading the virus to your eyes or genital area, or to other people. This is more likely to happen if this is your first cold sore outbreak. · Place ice or a cool, wet towel on the sores 3 times a day. Do this for 20 minutes each time. It may help to reduce redness and swelling. · If you are just getting a cold sore, try over-the-counter docosanol (Abreva) cream to reduce symptoms. · If your doctor prescribed antiviral medicine to relieve pain and help prevent outbreaks, be sure to follow the directions. · Take an over-the-counter pain medicine, such as acetaminophen (Tylenol), ibuprofen (Advil, Motrin), or naproxen (Aleve), as needed. Read and follow all instructions on the label. · Do not take two or more pain medicines at the same time unless the doctor told you to. Many pain medicines have acetaminophen, which is Tylenol. Too much acetaminophen (Tylenol) can be harmful.   · Avoid citrus fruit, tomatoes, and other foods that contain acid. · Use over-the-counter ointments to numb sore areas in the mouth or on the lips. These include Orajel and Anbesol. · Do not kiss or have oral sex with anyone while you have a cold sore. To prevent cold sores in the future  · Avoid long exposure of your lips to the sun. (Wear a hat to help shade your mouth.)  · Do not kiss or have oral sex with someone who has a cold sore. Do not have sex or oral sex with someone who has a genital herpes outbreak. · Using lip balm that contains sunscreen may help reduce outbreaks of cold sores. · Do not share towels, razors, silverware, toothbrushes, or other objects with a person who has a cold sore. When should you call for help? Call your doctor now or seek immediate medical care if:    · Your symptoms are painful and you want to try antiviral medicine.     · You have signs of infection, such as:  ? Increased pain, swelling, warmth, or redness. ? Red streaks leading from a cold sore. ? Pus draining from a cold sore. ? A fever.     · You have a cold sore and develop eye pain, eye discharge, or any changes in your vision. Watch closely for changes in your health, and be sure to contact your doctor if:    · The cold sore does not heal in 7 to 10 days.     · You get cold sores often. Where can you learn more? Go to http://www.gray.com/  Enter R850 in the search box to learn more about \"Cold Sores: Care Instructions. \"  Current as of: February 11, 2021               Content Version: 13.0  © 6947-0389 [employer]. Care instructions adapted under license by [employer] (which disclaims liability or warranty for this information). If you have questions about a medical condition or this instruction, always ask your healthcare professional. [place] [address] any warranty or liability for your use of this information.

## 2021-11-23 NOTE — PROGRESS NOTES
Chantell Brandt (: 1995) is a 32 y.o. female, established patient, here for evaluation of the following chief complaint(s):  Rash (on neck, thinks it is shingles) and Leg Pain (still has pain after steroid pack)       ASSESSMENT/PLAN:  Below is the assessment and plan developed based on review of pertinent history, physical exam, labs, studies, and medications. 1. Vesicular eruption --question whether this is shingles versus herpes simplex; will treat with antiviral but send labs to check for HSV.  -     HSV 1/2 AB, IGG/IGM; Future  -     valACYclovir (VALTREX) 1 gram tablet; Take 1 Tablet by mouth three (3) times daily. , Normal, Disp-21 Tablet, R-0    2. Radicular syndrome of left leg -- had limited improvement with Sterapred taper and did not note any improvement with muscle relaxants. Has history of Lumbar DDD; discussed with her PCP and will send her for PT; if fails to improve, would consider referral to Orthopedic spine specialist.  -     REFERRAL TO PHYSICAL THERAPY    3. Left thigh pain  -     REFERRAL TO PHYSICAL THERAPY    Follow-up with PCP if symptoms not improving. SUBJECTIVE/OBJECTIVE:  HPI    Patient of Dr. Benjy Chauhan who presents with complaints of vesicular rash to right side of neck that developed 2 days ago. Has had 3 prior episodes of similar symptoms with vesicular rash forming to right side of neck only and reports she was seen at urgent care centers where she was told she had recurrent shingles. Has never developed any rash in any other area and symptoms improve with antiviral medication. She is unsure as to whether she has been previously tested for herpes simplex virus. Noted some sensitivity to area for 1 to 2 days prior to onset of rash.     Reports pain to left thigh radiating down to left foot has been persisting for the past several months and was recently treated by her PCP with Sterapred Dosepak and Flexeril muscle relaxant but has not noted significant improvement. Has also been taking Lyrica but symptoms persist.  Reports having aching sensation in left lateral thigh that radiates down left leg into calf/ankle area. Has not noted any numbness/tingling but has sensation that left leg may be weaker than right. Upon review of records, has had lumbar MRI in 2020 which showed DDD of L4-L5, L5-S1 but no significant foraminal narrowing. Reports history of fibromyalgia. Patient Active Problem List   Diagnosis Code    Prediabetes R73.03    Hypercholesterolemia E78.00    Shaheed's angina K12.2    MRSA infection A49.02    PCOS (polycystic ovarian syndrome) E28.2    Shingles B02.9    Acquired hypothyroidism E03.9    Endometriosis N80.9    Gastroesophageal reflux disease without esophagitis K21.9    Migraine G43.909    Elevated liver enzymes R74.8    Syncope R55    Fibromyalgia M79.7    Costochondritis M94.0    Chest tightness R07.89    Obesity (BMI 30-39. 9) E66.9    Epigastric pain R10.13    Muscle cramping R25.2    Acute pyelonephritis N10    Valgus deformity of both great toes M20.11, M20.12     Past Surgical History:   Procedure Laterality Date    HX CHOLECYSTECTOMY      HX HYSTERECTOMY      Full including cervix     Social History     Socioeconomic History    Marital status: SINGLE     Spouse name: Not on file    Number of children: Not on file    Years of education: Not on file    Highest education level: Not on file   Occupational History    Not on file   Tobacco Use    Smoking status: Never Smoker    Smokeless tobacco: Never Used   Vaping Use    Vaping Use: Never used   Substance and Sexual Activity    Alcohol use: Yes     Comment: maybe 1-2x/mo    Drug use: Not Currently    Sexual activity: Not Currently   Other Topics Concern    Not on file   Social History Narrative    Not on file     Social Determinants of Health     Financial Resource Strain:     Difficulty of Paying Living Expenses: Not on file   Food Insecurity:     Worried About [facility] in the Last Year: Not on file    Soha of Food in the Last Year: Not on file   Transportation Needs:     Lack of Transportation (Medical): Not on file    Lack of Transportation (Non-Medical): Not on file   Physical Activity:     Days of Exercise per Week: Not on file    Minutes of Exercise per Session: Not on file   Stress:     Feeling of Stress : Not on file   Social Connections:     Frequency of Communication with Friends and Family: Not on file    Frequency of Social Gatherings with Friends and Family: Not on file    Attends Rastafarian Services: Not on file    Active Member of [address] [place] [address] or Organizations: Not on file    Attends Club or Organization Meetings: Not on file    Marital Status: Not on file   Intimate Partner Violence:     Fear of Current or Ex-Partner: Not on file    Emotionally Abused: Not on file    Physically Abused: Not on file    Sexually Abused: Not on file   Housing Stability:     Unable to Pay for Housing in the Last Year: Not on file    Number of [place] in the Last Year: Not on file    Unstable Housing in the Last Year: Not on file     Family History   Adopted: Yes   Problem Relation Age of Onset    Other Mother         inhaled glue etc while pregnate    Heart Disease Maternal Grandfather 33        massive    Arthritis-rheumatoid Maternal Aunt      Current Outpatient Medications   Medication Sig    valACYclovir (VALTREX) 1 gram tablet Take 1 Tablet by mouth three (3) times daily.  cyclobenzaprine (FLEXERIL) 5 mg tablet Take 1 Tablet by mouth three (3) times daily as needed for Muscle Spasm(s).  predniSONE (STERAPRED) 5 mg dose pack See administration instruction per 5mg dose pack    rosuvastatin (CRESTOR) 20 mg tablet Take 1 Tablet by mouth nightly. Indications: high cholesterol    pregabalin (LYRICA) 150 mg capsule Take 1 Capsule by mouth two (2) times a day. Max Daily Amount: 300 mg.     levothyroxine (SYNTHROID) 100 mcg tablet Take 1 Tablet by mouth Daily (before breakfast).  aspirin/acetaminophen/caffeine (EXCEDRIN MIGRAINE PO) Take  by mouth.  ibuprofen 100 mg tablet Take 100 mg by mouth every six (6) hours as needed for Pain.  lidocaine (LIDODERM) 5 % by TransDERmal route every twenty-four (24) hours. Apply patch to the affected area for 12 hours a day and remove for 12 hours a day.  metFORMIN (GLUCOPHAGE) 500 mg tablet Take 1 Tab by mouth two (2) times daily (with meals). No current facility-administered medications for this visit. Allergies   Allergen Reactions    Ciprofloxacin Hives     fever    Sulfa (Sulfonamide Antibiotics) Hives     fever     Immunization History   Administered Date(s) Administered    COVID-19, Moderna, Primary or Immunocompromised Series, MRNA, PF, 100mcg/0.5mL 05/01/2021, 06/01/2021    Influenza Vaccine [employer]) PF (>6 Mo Flulaval, Fluarix, and >3 Yrs Afluria, Fluzone 73162) 11/03/2021    Td, Adsorbed 12/18/2017    Tdap 02/25/2020       Review of Systems   Constitutional: Negative for chills, fatigue and fever. Respiratory: Negative for cough and shortness of breath. Cardiovascular: Negative for chest pain. Gastrointestinal: Negative for abdominal pain. Genitourinary: Negative for frequency. Musculoskeletal: Positive for myalgias (left thigh to left calf/ankle). Skin: Positive for rash. Neurological: Negative for numbness and headaches. Psychiatric/Behavioral: Negative for dysphoric mood. The patient is not nervous/anxious. Physical Exam  Vitals and nursing note reviewed. Constitutional:       General: She is not in acute distress. Appearance: Normal appearance. HENT:      Head: Normocephalic and atraumatic. Neck:        Comments: Area of erythema with vesicular cluster in center. Cardiovascular:      Rate and Rhythm: Normal rate and regular rhythm. Pulmonary:      Effort: Pulmonary effort is normal.      Breath sounds: Normal breath sounds. Musculoskeletal:        Legs:       Comments: Area of tenderness left hip down lateral aspect of left leg to ankle/foot. Skin:     Findings: Erythema and rash present. Neurological:      Mental Status: She is alert and oriented to person, place, and time. Psychiatric:         Mood and Affect: Mood normal.         Behavior: Behavior normal.           On this date 11/23/2021 I have spent 35 minutes reviewing previous notes, test results and face to face with the patient discussing the diagnosis and importance of compliance with the treatment plan as well as documenting on the day of the visit. An electronic signature was used to authenticate this note.   -- Silvia Beckford NP

## 2021-11-27 LAB
HSV1 IGG SER IA-ACNC: 12.5 INDEX (ref 0–0.9)
HSV1+2 IGM SER IA-ACNC: <0.91 RATIO (ref 0–0.9)
HSV2 IGG SER IA-ACNC: <0.91 INDEX (ref 0–0.9)

## 2021-11-28 NOTE — PROGRESS NOTES
Please call patient with results; she has antibodies for Herpes simplex type 1 which most likely is the cause of her recurrent rash to neck.

## 2021-11-29 ENCOUNTER — TELEPHONE (OUTPATIENT)
Dept: INTERNAL MEDICINE CLINIC | Age: 26
End: 2021-11-29

## 2021-11-29 NOTE — TELEPHONE ENCOUNTER
I called pt, no answer. LM on VM to call back for test results.  When pt calls back please notify of the below message

## 2021-11-29 NOTE — TELEPHONE ENCOUNTER
----- Message from Niharika Bragg NP sent at 11/28/2021 10:45 AM EST -----    ----- Message -----  From: Alison Stahl In [place]  Sent: 11/27/2021   1:37 AM EST  To: Niharika Bragg NP

## 2021-12-01 ENCOUNTER — TELEPHONE (OUTPATIENT)
Dept: INTERNAL MEDICINE CLINIC | Age: 26
End: 2021-12-01

## 2021-12-01 NOTE — TELEPHONE ENCOUNTER
Called patient with no answer; left message on voicemail that I will call her later this evening to answer questions.

## 2021-12-15 ENCOUNTER — TELEPHONE (OUTPATIENT)
Dept: INTERNAL MEDICINE CLINIC | Age: 26
End: 2021-12-15

## 2021-12-15 NOTE — TELEPHONE ENCOUNTER
Talked to patient regarding test results and she was quite agitated about positive results to HSV Type 1 antibodies. Advised her that she could have come into contact with HSV at any time in her lifetime. She was adamant about her rash being shingles and advised her that we can send out viral culture from blister fluid if she develops rash again to truly diagnose etiology. Questions answered.

## 2022-01-28 ENCOUNTER — VIRTUAL VISIT (OUTPATIENT)
Dept: INTERNAL MEDICINE CLINIC | Age: 27
End: 2022-01-28
Payer: MEDICAID

## 2022-01-28 DIAGNOSIS — J01.00 ACUTE NON-RECURRENT MAXILLARY SINUSITIS: Primary | ICD-10-CM

## 2022-01-28 PROCEDURE — 99213 OFFICE O/P EST LOW 20 MIN: CPT | Performed by: INTERNAL MEDICINE

## 2022-01-28 RX ORDER — AMOXICILLIN AND CLAVULANATE POTASSIUM 875; 125 MG/1; MG/1
1 TABLET, FILM COATED ORAL EVERY 12 HOURS
Qty: 10 TABLET | Refills: 0 | Status: SHIPPED | OUTPATIENT
Start: 2022-01-28 | End: 2022-02-02

## 2022-01-28 RX ORDER — CODEINE PHOSPHATE AND GUAIFENESIN 10; 100 MG/5ML; MG/5ML
5 SOLUTION ORAL
Qty: 60 ML | Refills: 0 | Status: SHIPPED | OUTPATIENT
Start: 2022-01-28 | End: 2022-02-04

## 2022-01-28 NOTE — LETTER
NOTIFICATION RETURN TO WORK / SCHOOL    1/28/2022 8:19 AM    Ms. Carolann Darby  [place] [address]      To Whom It May Concern:    Carolann Darby is currently under the care of [address] [place] [address],[address]. She will return to work/school on: 1/31/22 *or later pending COVID-19 test*    If there are questions or concerns please have the patient contact our office.         Sincerely,      Jaymie Sepulveda MD

## 2022-01-28 NOTE — ASSESSMENT & PLAN NOTE
Ongoing for a week - HA, sinus pain, throat pain, night sweats, no drainage, fatigue, malaise   Now started having a cough, mild SOB   No change in baseline body aches   No sick contacts (family is sick with COVID but she hasn't been around them)  Since started, getting worse   augmentin and guaif-codeine sent.  rec covid test. Letter for work provided

## 2022-03-08 ENCOUNTER — TELEPHONE (OUTPATIENT)
Dept: INTERNAL MEDICINE CLINIC | Age: 27
End: 2022-03-08

## 2022-03-08 NOTE — TELEPHONE ENCOUNTER
Return call to patient; no answer nurse lvm to advise of list of medications has printed and can be picked up at the front office.

## 2022-03-08 NOTE — TELEPHONE ENCOUNTER
----- Message from Merary Vilchis sent at 3/8/2022 11:31 AM EST -----  Subject: Message to Provider    QUESTIONS  Information for Provider? Patient needs a list of all of her medications   please. Can you please return the call.  ---------------------------------------------------------------------------  --------------  CALL BACK INFO  What is the best way for the office to contact you? OK to leave message on   voicemail  Preferred Call Back Phone Number? [phone]  ---------------------------------------------------------------------------  --------------  SCRIPT ANSWERS  Relationship to Patient?  Self

## 2022-03-18 PROBLEM — E03.9 ACQUIRED HYPOTHYROIDISM: Status: ACTIVE | Noted: 2020-02-25

## 2022-03-18 PROBLEM — K12.2 LUDWIG'S ANGINA: Status: ACTIVE | Noted: 2020-02-25

## 2022-03-18 PROBLEM — E66.9 OBESITY (BMI 30-39.9): Status: ACTIVE | Noted: 2021-05-18

## 2022-03-18 PROBLEM — R25.2 MUSCLE CRAMPING: Status: ACTIVE | Noted: 2021-10-05

## 2022-03-18 PROBLEM — R55 SYNCOPE: Status: ACTIVE | Noted: 2020-03-26

## 2022-03-18 PROBLEM — M79.7 FIBROMYALGIA: Status: ACTIVE | Noted: 2020-03-26

## 2022-03-18 PROBLEM — E78.00 HYPERCHOLESTEROLEMIA: Status: ACTIVE | Noted: 2020-02-25

## 2022-03-19 PROBLEM — A49.02 MRSA INFECTION: Status: ACTIVE | Noted: 2020-02-25

## 2022-03-19 PROBLEM — N10 ACUTE PYELONEPHRITIS: Status: ACTIVE | Noted: 2021-10-05

## 2022-03-19 PROBLEM — R74.8 ELEVATED LIVER ENZYMES: Status: ACTIVE | Noted: 2020-02-25

## 2022-03-19 PROBLEM — B02.9 SHINGLES: Status: ACTIVE | Noted: 2020-02-25

## 2022-03-19 PROBLEM — K21.9 GASTROESOPHAGEAL REFLUX DISEASE WITHOUT ESOPHAGITIS: Status: ACTIVE | Noted: 2020-02-25

## 2022-03-19 PROBLEM — M20.12 VALGUS DEFORMITY OF BOTH GREAT TOES: Status: ACTIVE | Noted: 2021-11-03

## 2022-03-19 PROBLEM — N80.9 ENDOMETRIOSIS: Status: ACTIVE | Noted: 2020-02-25

## 2022-03-19 PROBLEM — M20.11 VALGUS DEFORMITY OF BOTH GREAT TOES: Status: ACTIVE | Noted: 2021-11-03

## 2022-03-19 PROBLEM — R73.03 PREDIABETES: Status: ACTIVE | Noted: 2020-02-25

## 2022-03-20 PROBLEM — R10.13 EPIGASTRIC PAIN: Status: ACTIVE | Noted: 2021-05-18

## 2022-03-20 PROBLEM — R07.89 CHEST TIGHTNESS: Status: ACTIVE | Noted: 2021-05-18

## 2022-03-20 PROBLEM — M94.0 COSTOCHONDRITIS: Status: ACTIVE | Noted: 2021-01-01

## 2022-03-20 PROBLEM — J01.00 ACUTE NON-RECURRENT MAXILLARY SINUSITIS: Status: ACTIVE | Noted: 2021-06-23

## 2022-03-20 PROBLEM — G43.909 MIGRAINE: Status: ACTIVE | Noted: 2020-02-25

## 2022-03-20 PROBLEM — E28.2 PCOS (POLYCYSTIC OVARIAN SYNDROME): Status: ACTIVE | Noted: 2020-02-25

## 2023-01-02 NOTE — ASSESSMENT & PLAN NOTE
==>. CBC normal.  ALT and  and 55. After normalizing previously. Elevated LFTs likely related to metabolic syndrome. Plan to discuss restarting statin at next visit and rechecking. Follow-up with GI as discussed.
==>. CBC normal.  ALT and  and 55. After normalizing previously. Elevated LFTs likely related to metabolic syndrome. Plan to discuss restarting statin at next visit and rechecking. Follow-up with GI as discussed.
Attending Only

## 2023-05-24 RX ORDER — LEVOTHYROXINE SODIUM 0.1 MG/1
100 TABLET ORAL
COMMUNITY
Start: 2021-10-05

## 2023-05-24 RX ORDER — PREGABALIN 150 MG/1
150 CAPSULE ORAL 2 TIMES DAILY
COMMUNITY
Start: 2021-10-05

## 2023-05-24 RX ORDER — LIDOCAINE 50 MG/G
PATCH TOPICAL EVERY 24 HOURS
COMMUNITY

## 2023-05-24 RX ORDER — ROSUVASTATIN CALCIUM 20 MG/1
20 TABLET, COATED ORAL
COMMUNITY
Start: 2021-10-05

## 2023-05-24 RX ORDER — CYCLOBENZAPRINE HCL 5 MG
5 TABLET ORAL 3 TIMES DAILY PRN
COMMUNITY
Start: 2021-11-03

## 2023-05-24 RX ORDER — VALACYCLOVIR HYDROCHLORIDE 1 G/1
1000 TABLET, FILM COATED ORAL 3 TIMES DAILY
COMMUNITY
Start: 2021-11-23

## 2023-08-22 LAB — HBA1C MFR BLD HPLC: 5.3 %
